# Patient Record
Sex: FEMALE | Race: WHITE | NOT HISPANIC OR LATINO
[De-identification: names, ages, dates, MRNs, and addresses within clinical notes are randomized per-mention and may not be internally consistent; named-entity substitution may affect disease eponyms.]

---

## 2017-06-30 PROBLEM — Z00.00 ENCOUNTER FOR PREVENTIVE HEALTH EXAMINATION: Status: ACTIVE | Noted: 2017-06-30

## 2017-07-17 ENCOUNTER — APPOINTMENT (OUTPATIENT)
Dept: ORTHOPEDIC SURGERY | Facility: CLINIC | Age: 77
End: 2017-07-17

## 2017-07-17 ENCOUNTER — OTHER (OUTPATIENT)
Age: 77
End: 2017-07-17

## 2017-07-17 DIAGNOSIS — M79.643 PAIN IN UNSPECIFIED HAND: ICD-10-CM

## 2017-08-03 ENCOUNTER — APPOINTMENT (OUTPATIENT)
Dept: ORTHOPEDIC SURGERY | Facility: CLINIC | Age: 77
End: 2017-08-03
Payer: MEDICARE

## 2017-08-03 VITALS
SYSTOLIC BLOOD PRESSURE: 141 MMHG | WEIGHT: 150 LBS | DIASTOLIC BLOOD PRESSURE: 74 MMHG | HEIGHT: 64 IN | BODY MASS INDEX: 25.61 KG/M2 | HEART RATE: 61 BPM

## 2017-08-03 DIAGNOSIS — Z87.891 PERSONAL HISTORY OF NICOTINE DEPENDENCE: ICD-10-CM

## 2017-08-03 DIAGNOSIS — Z86.39 PERSONAL HISTORY OF OTHER ENDOCRINE, NUTRITIONAL AND METABOLIC DISEASE: ICD-10-CM

## 2017-08-03 DIAGNOSIS — Z80.9 FAMILY HISTORY OF MALIGNANT NEOPLASM, UNSPECIFIED: ICD-10-CM

## 2017-08-03 DIAGNOSIS — Z78.9 OTHER SPECIFIED HEALTH STATUS: ICD-10-CM

## 2017-08-03 DIAGNOSIS — Z86.79 PERSONAL HISTORY OF OTHER DISEASES OF THE CIRCULATORY SYSTEM: ICD-10-CM

## 2017-08-03 DIAGNOSIS — M67.442 GANGLION, LEFT HAND: ICD-10-CM

## 2017-08-03 PROCEDURE — 99203 OFFICE O/P NEW LOW 30 MIN: CPT

## 2017-08-03 PROCEDURE — 73120 X-RAY EXAM OF HAND: CPT | Mod: LT

## 2017-08-03 RX ORDER — ATORVASTATIN CALCIUM 80 MG/1
TABLET, FILM COATED ORAL
Refills: 0 | Status: ACTIVE | COMMUNITY

## 2017-08-03 RX ORDER — ATENOLOL 50 MG/1
TABLET ORAL
Refills: 0 | Status: ACTIVE | COMMUNITY

## 2017-08-03 RX ORDER — LOSARTAN POTASSIUM 100 MG/1
TABLET, FILM COATED ORAL
Refills: 0 | Status: ACTIVE | COMMUNITY

## 2017-08-03 RX ORDER — FENOFIBRATE 145 MG/1
TABLET ORAL
Refills: 0 | Status: ACTIVE | COMMUNITY

## 2018-04-12 ENCOUNTER — EMERGENCY (EMERGENCY)
Facility: HOSPITAL | Age: 78
LOS: 1 days | End: 2018-04-12
Attending: STUDENT IN AN ORGANIZED HEALTH CARE EDUCATION/TRAINING PROGRAM
Payer: COMMERCIAL

## 2018-04-12 VITALS
TEMPERATURE: 98 F | DIASTOLIC BLOOD PRESSURE: 92 MMHG | HEART RATE: 102 BPM | RESPIRATION RATE: 23 BRPM | SYSTOLIC BLOOD PRESSURE: 182 MMHG | OXYGEN SATURATION: 96 % | HEIGHT: 64 IN | WEIGHT: 149.91 LBS

## 2018-04-12 PROCEDURE — 99283 EMERGENCY DEPT VISIT LOW MDM: CPT | Mod: 25

## 2018-04-12 PROCEDURE — 73130 X-RAY EXAM OF HAND: CPT | Mod: 26,RT

## 2018-04-12 PROCEDURE — 73130 X-RAY EXAM OF HAND: CPT

## 2018-04-12 PROCEDURE — 73564 X-RAY EXAM KNEE 4 OR MORE: CPT | Mod: 26,RT

## 2018-04-12 PROCEDURE — 99284 EMERGENCY DEPT VISIT MOD MDM: CPT | Mod: 25

## 2018-04-12 PROCEDURE — 29125 APPL SHORT ARM SPLINT STATIC: CPT | Mod: RT

## 2018-04-12 PROCEDURE — 73564 X-RAY EXAM KNEE 4 OR MORE: CPT

## 2018-04-12 RX ORDER — ACETAMINOPHEN 500 MG
975 TABLET ORAL ONCE
Qty: 0 | Refills: 0 | Status: COMPLETED | OUTPATIENT
Start: 2018-04-12 | End: 2018-04-12

## 2018-04-12 RX ADMIN — Medication 975 MILLIGRAM(S): at 02:59

## 2018-04-12 NOTE — ED ADULT TRIAGE NOTE - CHIEF COMPLAINT QUOTE
I was walking I turned fast foot got caught on uneven cement I fell rt hand pain swelling and rt knee pain. denies blood thinners denies hitting head occurred this afternoon

## 2018-04-12 NOTE — ED ADULT NURSE NOTE - OBJECTIVE STATEMENT
pt A&OX4, c/o right knee and hand pain, pt states that she has hx of vertigo and turned her head to fast got dizziness and tripped over uneven cement and landed on right knee and hand, pt has abrasion to right knee and and hand, pt denies loc, head trauma, blood thinners,

## 2018-04-12 NOTE — ED PROVIDER NOTE - OBJECTIVE STATEMENT
79 y/o F pt with hx of vertigo presents to ED c/o R hand pain, R hand swelling and R knee pain s/p trip and fall that occurred yesterday afternoon. Pt states she turned around quickly then tripped over uneven cement. Pt is not on blood thinners. Denies LOC, head injury, neck pain, back pain, nausea, and vomiting. No further complaints at this time.

## 2018-04-19 ENCOUNTER — APPOINTMENT (OUTPATIENT)
Dept: ORTHOPEDIC SURGERY | Facility: CLINIC | Age: 78
End: 2018-04-19
Payer: MEDICARE

## 2018-04-19 VITALS
WEIGHT: 150 LBS | DIASTOLIC BLOOD PRESSURE: 70 MMHG | HEIGHT: 64 IN | SYSTOLIC BLOOD PRESSURE: 120 MMHG | HEART RATE: 72 BPM | BODY MASS INDEX: 25.61 KG/M2

## 2018-04-19 DIAGNOSIS — M79.641 PAIN IN RIGHT HAND: ICD-10-CM

## 2018-04-19 DIAGNOSIS — S42.294A OTHER NONDISPLACED FRACTURE OF UPPER END OF RIGHT HUMERUS, INITIAL ENCOUNTER FOR CLOSED FRACTURE: ICD-10-CM

## 2018-04-19 PROCEDURE — 26600 TREAT METACARPAL FRACTURE: CPT | Mod: RT

## 2018-04-19 PROCEDURE — 73130 X-RAY EXAM OF HAND: CPT | Mod: RT

## 2018-04-19 PROCEDURE — 99215 OFFICE O/P EST HI 40 MIN: CPT | Mod: 57

## 2018-04-20 PROBLEM — S42.294A OTHER CLOSED NONDISPLACED FRACTURE OF PROXIMAL END OF RIGHT HUMERUS, INITIAL ENCOUNTER: Status: ACTIVE | Noted: 2018-04-19

## 2018-05-08 ENCOUNTER — APPOINTMENT (OUTPATIENT)
Dept: ORTHOPEDIC SURGERY | Facility: CLINIC | Age: 78
End: 2018-05-08
Payer: MEDICARE

## 2018-05-08 VITALS
HEIGHT: 64 IN | WEIGHT: 150 LBS | DIASTOLIC BLOOD PRESSURE: 60 MMHG | HEART RATE: 71 BPM | SYSTOLIC BLOOD PRESSURE: 111 MMHG | BODY MASS INDEX: 25.61 KG/M2

## 2018-05-08 DIAGNOSIS — S62.309A UNSPECIFIED FRACTURE OF UNSPECIFIED METACARPAL BONE, INITIAL ENCOUNTER FOR CLOSED FRACTURE: ICD-10-CM

## 2018-05-08 PROCEDURE — 99024 POSTOP FOLLOW-UP VISIT: CPT

## 2018-05-08 PROCEDURE — 73130 X-RAY EXAM OF HAND: CPT | Mod: RT

## 2018-05-21 ENCOUNTER — OUTPATIENT (OUTPATIENT)
Dept: OUTPATIENT SERVICES | Facility: HOSPITAL | Age: 78
LOS: 1 days | End: 2018-05-21
Payer: COMMERCIAL

## 2018-05-21 DIAGNOSIS — S62.309A UNSPECIFIED FRACTURE OF UNSPECIFIED METACARPAL BONE, INITIAL ENCOUNTER FOR CLOSED FRACTURE: ICD-10-CM

## 2018-05-21 DIAGNOSIS — Z51.89 ENCOUNTER FOR OTHER SPECIFIED AFTERCARE: ICD-10-CM

## 2018-06-07 ENCOUNTER — APPOINTMENT (OUTPATIENT)
Dept: ORTHOPEDIC SURGERY | Facility: CLINIC | Age: 78
End: 2018-06-07
Payer: MEDICARE

## 2018-06-07 VITALS
HEIGHT: 64 IN | HEART RATE: 58 BPM | SYSTOLIC BLOOD PRESSURE: 152 MMHG | DIASTOLIC BLOOD PRESSURE: 78 MMHG | BODY MASS INDEX: 25.61 KG/M2 | WEIGHT: 150 LBS

## 2018-06-07 DIAGNOSIS — G56.01 CARPAL TUNNEL SYNDROME, RIGHT UPPER LIMB: ICD-10-CM

## 2018-06-07 DIAGNOSIS — M65.331 TRIGGER FINGER, RIGHT MIDDLE FINGER: ICD-10-CM

## 2018-06-07 PROCEDURE — 99214 OFFICE O/P EST MOD 30 MIN: CPT | Mod: 24

## 2018-06-26 PROCEDURE — 97140 MANUAL THERAPY 1/> REGIONS: CPT

## 2018-06-26 PROCEDURE — 97166 OT EVAL MOD COMPLEX 45 MIN: CPT

## 2018-06-26 PROCEDURE — 97110 THERAPEUTIC EXERCISES: CPT

## 2018-08-07 ENCOUNTER — EMERGENCY (EMERGENCY)
Facility: HOSPITAL | Age: 78
LOS: 1 days | Discharge: HOME | End: 2018-08-07
Attending: EMERGENCY MEDICINE
Payer: COMMERCIAL

## 2018-08-07 VITALS
HEART RATE: 76 BPM | OXYGEN SATURATION: 95 % | DIASTOLIC BLOOD PRESSURE: 70 MMHG | SYSTOLIC BLOOD PRESSURE: 126 MMHG | TEMPERATURE: 98 F | RESPIRATION RATE: 18 BRPM

## 2018-08-07 VITALS — WEIGHT: 149.03 LBS | HEIGHT: 64 IN

## 2018-08-07 PROBLEM — I10 ESSENTIAL (PRIMARY) HYPERTENSION: Chronic | Status: ACTIVE | Noted: 2018-04-12

## 2018-08-07 PROBLEM — I21.9 ACUTE MYOCARDIAL INFARCTION, UNSPECIFIED: Chronic | Status: ACTIVE | Noted: 2018-04-12

## 2018-08-07 PROBLEM — R42 DIZZINESS AND GIDDINESS: Chronic | Status: ACTIVE | Noted: 2018-04-17

## 2018-08-07 PROBLEM — E78.00 PURE HYPERCHOLESTEROLEMIA, UNSPECIFIED: Chronic | Status: ACTIVE | Noted: 2018-04-12

## 2018-08-07 LAB
ALBUMIN SERPL ELPH-MCNC: 4.2 G/DL — SIGNIFICANT CHANGE UP (ref 3.3–5.2)
ALP SERPL-CCNC: 64 U/L — SIGNIFICANT CHANGE UP (ref 40–120)
ALT FLD-CCNC: 24 U/L — SIGNIFICANT CHANGE UP
ANION GAP SERPL CALC-SCNC: 18 MMOL/L — HIGH (ref 5–17)
APPEARANCE UR: CLEAR — SIGNIFICANT CHANGE UP
AST SERPL-CCNC: 46 U/L — HIGH
BACTERIA # UR AUTO: ABNORMAL
BASOPHILS # BLD AUTO: 0 K/UL — SIGNIFICANT CHANGE UP (ref 0–0.2)
BASOPHILS NFR BLD AUTO: 0.3 % — SIGNIFICANT CHANGE UP (ref 0–2)
BILIRUB SERPL-MCNC: 1.3 MG/DL — SIGNIFICANT CHANGE UP (ref 0.4–2)
BILIRUB UR-MCNC: NEGATIVE — SIGNIFICANT CHANGE UP
BUN SERPL-MCNC: 26 MG/DL — HIGH (ref 8–20)
CALCIUM SERPL-MCNC: 9.8 MG/DL — SIGNIFICANT CHANGE UP (ref 8.6–10.2)
CHLORIDE SERPL-SCNC: 96 MMOL/L — LOW (ref 98–107)
CO2 SERPL-SCNC: 23 MMOL/L — SIGNIFICANT CHANGE UP (ref 22–29)
COLOR SPEC: YELLOW — SIGNIFICANT CHANGE UP
CREAT SERPL-MCNC: 1.16 MG/DL — SIGNIFICANT CHANGE UP (ref 0.5–1.3)
DIFF PNL FLD: ABNORMAL
EOSINOPHIL # BLD AUTO: 0 K/UL — SIGNIFICANT CHANGE UP (ref 0–0.5)
EOSINOPHIL NFR BLD AUTO: 0.1 % — SIGNIFICANT CHANGE UP (ref 0–6)
EPI CELLS # UR: SIGNIFICANT CHANGE UP
GLUCOSE SERPL-MCNC: 125 MG/DL — HIGH (ref 70–115)
GLUCOSE UR QL: NEGATIVE MG/DL — SIGNIFICANT CHANGE UP
HCT VFR BLD CALC: 51.3 % — HIGH (ref 37–47)
HGB BLD-MCNC: 16.6 G/DL — HIGH (ref 12–16)
KETONES UR-MCNC: NEGATIVE — SIGNIFICANT CHANGE UP
LACTATE BLDV-MCNC: 1.3 MMOL/L — SIGNIFICANT CHANGE UP (ref 0.5–2)
LEUKOCYTE ESTERASE UR-ACNC: ABNORMAL
LYMPHOCYTES # BLD AUTO: 0.9 K/UL — LOW (ref 1–4.8)
LYMPHOCYTES # BLD AUTO: 7.4 % — LOW (ref 20–55)
MAGNESIUM SERPL-MCNC: 1.9 MG/DL — SIGNIFICANT CHANGE UP (ref 1.6–2.6)
MCHC RBC-ENTMCNC: 28.6 PG — SIGNIFICANT CHANGE UP (ref 27–31)
MCHC RBC-ENTMCNC: 32.4 G/DL — SIGNIFICANT CHANGE UP (ref 32–36)
MCV RBC AUTO: 88.4 FL — SIGNIFICANT CHANGE UP (ref 81–99)
MONOCYTES # BLD AUTO: 0.8 K/UL — SIGNIFICANT CHANGE UP (ref 0–0.8)
MONOCYTES NFR BLD AUTO: 6.6 % — SIGNIFICANT CHANGE UP (ref 3–10)
NEUTROPHILS # BLD AUTO: 10 K/UL — HIGH (ref 1.8–8)
NEUTROPHILS NFR BLD AUTO: 85 % — HIGH (ref 37–73)
NITRITE UR-MCNC: POSITIVE
PH UR: 7 — SIGNIFICANT CHANGE UP (ref 5–8)
PHOSPHATE SERPL-MCNC: 2.6 MG/DL — SIGNIFICANT CHANGE UP (ref 2.4–4.7)
PLATELET # BLD AUTO: 368 K/UL — SIGNIFICANT CHANGE UP (ref 150–400)
POTASSIUM SERPL-MCNC: 5.1 MMOL/L — SIGNIFICANT CHANGE UP (ref 3.5–5.3)
POTASSIUM SERPL-SCNC: 5.1 MMOL/L — SIGNIFICANT CHANGE UP (ref 3.5–5.3)
PROT SERPL-MCNC: 8 G/DL — SIGNIFICANT CHANGE UP (ref 6.6–8.7)
PROT UR-MCNC: 30 MG/DL
RBC # BLD: 5.8 M/UL — HIGH (ref 4.4–5.2)
RBC # FLD: 14.6 % — SIGNIFICANT CHANGE UP (ref 11–15.6)
RBC CASTS # UR COMP ASSIST: SIGNIFICANT CHANGE UP /HPF (ref 0–4)
SODIUM SERPL-SCNC: 137 MMOL/L — SIGNIFICANT CHANGE UP (ref 135–145)
SP GR SPEC: 1.01 — SIGNIFICANT CHANGE UP (ref 1.01–1.02)
TROPONIN T SERPL-MCNC: <0.01 NG/ML — SIGNIFICANT CHANGE UP (ref 0–0.06)
UROBILINOGEN FLD QL: NEGATIVE MG/DL — SIGNIFICANT CHANGE UP
WBC # BLD: 11.7 K/UL — HIGH (ref 4.8–10.8)
WBC # FLD AUTO: 11.7 K/UL — HIGH (ref 4.8–10.8)
WBC UR QL: ABNORMAL

## 2018-08-07 PROCEDURE — 84100 ASSAY OF PHOSPHORUS: CPT

## 2018-08-07 PROCEDURE — 70450 CT HEAD/BRAIN W/O DYE: CPT | Mod: 26

## 2018-08-07 PROCEDURE — 83735 ASSAY OF MAGNESIUM: CPT

## 2018-08-07 PROCEDURE — 83605 ASSAY OF LACTIC ACID: CPT

## 2018-08-07 PROCEDURE — 87150 DNA/RNA AMPLIFIED PROBE: CPT

## 2018-08-07 PROCEDURE — 93005 ELECTROCARDIOGRAM TRACING: CPT

## 2018-08-07 PROCEDURE — 81001 URINALYSIS AUTO W/SCOPE: CPT

## 2018-08-07 PROCEDURE — 87040 BLOOD CULTURE FOR BACTERIA: CPT

## 2018-08-07 PROCEDURE — 70450 CT HEAD/BRAIN W/O DYE: CPT

## 2018-08-07 PROCEDURE — 96374 THER/PROPH/DIAG INJ IV PUSH: CPT

## 2018-08-07 PROCEDURE — 99284 EMERGENCY DEPT VISIT MOD MDM: CPT

## 2018-08-07 PROCEDURE — 99284 EMERGENCY DEPT VISIT MOD MDM: CPT | Mod: 25

## 2018-08-07 PROCEDURE — 84484 ASSAY OF TROPONIN QUANT: CPT

## 2018-08-07 PROCEDURE — 80053 COMPREHEN METABOLIC PANEL: CPT

## 2018-08-07 PROCEDURE — 36415 COLL VENOUS BLD VENIPUNCTURE: CPT

## 2018-08-07 PROCEDURE — 96375 TX/PRO/DX INJ NEW DRUG ADDON: CPT

## 2018-08-07 PROCEDURE — 85027 COMPLETE CBC AUTOMATED: CPT

## 2018-08-07 PROCEDURE — 71046 X-RAY EXAM CHEST 2 VIEWS: CPT

## 2018-08-07 PROCEDURE — 71046 X-RAY EXAM CHEST 2 VIEWS: CPT | Mod: 26

## 2018-08-07 PROCEDURE — 82550 ASSAY OF CK (CPK): CPT

## 2018-08-07 PROCEDURE — 87186 SC STD MICRODIL/AGAR DIL: CPT

## 2018-08-07 PROCEDURE — 93010 ELECTROCARDIOGRAM REPORT: CPT

## 2018-08-07 RX ORDER — METOCLOPRAMIDE HCL 10 MG
10 TABLET ORAL ONCE
Qty: 0 | Refills: 0 | Status: DISCONTINUED | OUTPATIENT
Start: 2018-08-07 | End: 2018-08-07

## 2018-08-07 RX ORDER — SODIUM CHLORIDE 9 MG/ML
2000 INJECTION INTRAMUSCULAR; INTRAVENOUS; SUBCUTANEOUS ONCE
Qty: 0 | Refills: 0 | Status: COMPLETED | OUTPATIENT
Start: 2018-08-07 | End: 2018-08-07

## 2018-08-07 RX ORDER — CEPHALEXIN 500 MG
1 CAPSULE ORAL
Qty: 14 | Refills: 0 | OUTPATIENT
Start: 2018-08-07 | End: 2018-08-13

## 2018-08-07 RX ORDER — METOCLOPRAMIDE HCL 10 MG
10 TABLET ORAL ONCE
Qty: 0 | Refills: 0 | Status: COMPLETED | OUTPATIENT
Start: 2018-08-07 | End: 2018-08-07

## 2018-08-07 RX ORDER — SODIUM CHLORIDE 9 MG/ML
1000 INJECTION INTRAMUSCULAR; INTRAVENOUS; SUBCUTANEOUS ONCE
Qty: 0 | Refills: 0 | Status: COMPLETED | OUTPATIENT
Start: 2018-08-07 | End: 2018-08-07

## 2018-08-07 RX ORDER — CEFTRIAXONE 500 MG/1
1 INJECTION, POWDER, FOR SOLUTION INTRAMUSCULAR; INTRAVENOUS ONCE
Qty: 0 | Refills: 0 | Status: COMPLETED | OUTPATIENT
Start: 2018-08-07 | End: 2018-08-07

## 2018-08-07 RX ORDER — KETOROLAC TROMETHAMINE 30 MG/ML
30 SYRINGE (ML) INJECTION ONCE
Qty: 0 | Refills: 0 | Status: DISCONTINUED | OUTPATIENT
Start: 2018-08-07 | End: 2018-08-07

## 2018-08-07 RX ADMIN — Medication 10 MILLIGRAM(S): at 15:46

## 2018-08-07 RX ADMIN — Medication 30 MILLIGRAM(S): at 18:36

## 2018-08-07 RX ADMIN — SODIUM CHLORIDE 2000 MILLILITER(S): 9 INJECTION INTRAMUSCULAR; INTRAVENOUS; SUBCUTANEOUS at 20:14

## 2018-08-07 RX ADMIN — SODIUM CHLORIDE 1000 MILLILITER(S): 9 INJECTION INTRAMUSCULAR; INTRAVENOUS; SUBCUTANEOUS at 18:36

## 2018-08-07 RX ADMIN — CEFTRIAXONE 100 GRAM(S): 500 INJECTION, POWDER, FOR SOLUTION INTRAMUSCULAR; INTRAVENOUS at 18:36

## 2018-08-07 NOTE — ED PROVIDER NOTE - OBJECTIVE STATEMENT
This patient is a 78 year old woman who presents to the ER c/o generalized weakness, fatigue, and headache since yesterday.  She denies fever, visual changes, vomiting, chest pain, cough, SOB, and abdominal pain.  She does report diarrhea and nausea.  She has tried taking tylenol for her headache with mild relief.  Patient states that she is cold and feels chills all the way to her bones.

## 2018-08-07 NOTE — ED PROVIDER NOTE - MEDICAL DECISION MAKING DETAILS
Patient with chills, generalized weakness, headache and nausea.  CT negative for ICH.  No focal weakness on exam, no abdominal tenderness.  Patient concerned about chills.  Rectal temp elevated UA +.  Patient treated with abx and antipyretics with improvement.  She was given prescription for abx and instructed to follow-up with PMD.

## 2018-08-07 NOTE — ED ADULT NURSE REASSESSMENT NOTE - NS ED NURSE REASSESS COMMENT FT1
pt in agreement to rectal temp, 104.9 rectally, other vitals obtained, md walters made aware, to carry out orders posted, pt remains a/o x 3

## 2018-08-07 NOTE — ED ADULT TRIAGE NOTE - CHIEF COMPLAINT QUOTE
Pt presents to ED for eval of generalized malaise and weakness after being in the sun yesterday. Pt c/o chills and decreased appetite. C/o episodes of diarrhea and nausea. C/o generalized body "heaviness"

## 2018-08-07 NOTE — ED ADULT NURSE REASSESSMENT NOTE - NS ED NURSE REASSESS COMMENT FT1
patient rec'd at 1930 patient alert and coopertive iv to right ac intact and infusing well. patient states feeling better and aware of plan to recheck temp and increase fluids

## 2018-08-08 ENCOUNTER — INPATIENT (INPATIENT)
Facility: HOSPITAL | Age: 78
LOS: 6 days | Discharge: HOME | DRG: 871 | End: 2018-08-15
Attending: HOSPITALIST
Payer: COMMERCIAL

## 2018-08-08 VITALS
WEIGHT: 149.03 LBS | OXYGEN SATURATION: 90 % | DIASTOLIC BLOOD PRESSURE: 78 MMHG | SYSTOLIC BLOOD PRESSURE: 184 MMHG | TEMPERATURE: 100 F | HEART RATE: 97 BPM | RESPIRATION RATE: 26 BRPM | HEIGHT: 64 IN

## 2018-08-08 DIAGNOSIS — E78.00 PURE HYPERCHOLESTEROLEMIA, UNSPECIFIED: ICD-10-CM

## 2018-08-08 DIAGNOSIS — I10 ESSENTIAL (PRIMARY) HYPERTENSION: ICD-10-CM

## 2018-08-08 DIAGNOSIS — Z84.89 FAMILY HISTORY OF OTHER SPECIFIED CONDITIONS: Chronic | ICD-10-CM

## 2018-08-08 DIAGNOSIS — J18.9 PNEUMONIA, UNSPECIFIED ORGANISM: ICD-10-CM

## 2018-08-08 DIAGNOSIS — R78.81 BACTEREMIA: ICD-10-CM

## 2018-08-08 DIAGNOSIS — J40 BRONCHITIS, NOT SPECIFIED AS ACUTE OR CHRONIC: ICD-10-CM

## 2018-08-08 LAB
ALBUMIN SERPL ELPH-MCNC: 3.8 G/DL — SIGNIFICANT CHANGE UP (ref 3.3–5.2)
ALP SERPL-CCNC: 65 U/L — SIGNIFICANT CHANGE UP (ref 40–120)
ALT FLD-CCNC: 43 U/L — HIGH
ANION GAP SERPL CALC-SCNC: 17 MMOL/L — SIGNIFICANT CHANGE UP (ref 5–17)
ANISOCYTOSIS BLD QL: SLIGHT — SIGNIFICANT CHANGE UP
APPEARANCE UR: CLEAR — SIGNIFICANT CHANGE UP
APTT BLD: 30.3 SEC — SIGNIFICANT CHANGE UP (ref 27.5–37.4)
AST SERPL-CCNC: 60 U/L — HIGH
BACTERIA # UR AUTO: ABNORMAL
BASOPHILS # BLD AUTO: 0 K/UL — SIGNIFICANT CHANGE UP (ref 0–0.2)
BASOPHILS NFR BLD AUTO: 0 % — SIGNIFICANT CHANGE UP (ref 0–2)
BILIRUB SERPL-MCNC: 0.8 MG/DL — SIGNIFICANT CHANGE UP (ref 0.4–2)
BILIRUB UR-MCNC: NEGATIVE — SIGNIFICANT CHANGE UP
BUN SERPL-MCNC: 19 MG/DL — SIGNIFICANT CHANGE UP (ref 8–20)
CALCIUM SERPL-MCNC: 8.1 MG/DL — LOW (ref 8.6–10.2)
CHLORIDE SERPL-SCNC: 102 MMOL/L — SIGNIFICANT CHANGE UP (ref 98–107)
CO2 SERPL-SCNC: 19 MMOL/L — LOW (ref 22–29)
COLOR SPEC: YELLOW — SIGNIFICANT CHANGE UP
CREAT SERPL-MCNC: 1.01 MG/DL — SIGNIFICANT CHANGE UP (ref 0.5–1.3)
DIFF PNL FLD: ABNORMAL
E COLI DNA BLD POS QL NAA+NON-PROBE: SIGNIFICANT CHANGE UP
EOSINOPHIL # BLD AUTO: 0 K/UL — SIGNIFICANT CHANGE UP (ref 0–0.5)
EOSINOPHIL NFR BLD AUTO: 1 % — SIGNIFICANT CHANGE UP (ref 0–5)
EPI CELLS # UR: SIGNIFICANT CHANGE UP
GLUCOSE SERPL-MCNC: 189 MG/DL — HIGH (ref 70–115)
GLUCOSE UR QL: 250 MG/DL
HCT VFR BLD CALC: 45.8 % — SIGNIFICANT CHANGE UP (ref 37–47)
HGB BLD-MCNC: 15 G/DL — SIGNIFICANT CHANGE UP (ref 12–16)
INR BLD: 1.25 RATIO — HIGH (ref 0.88–1.16)
KETONES UR-MCNC: ABNORMAL
LACTATE BLDV-MCNC: 1.2 MMOL/L — SIGNIFICANT CHANGE UP (ref 0.5–2)
LACTATE BLDV-MCNC: 1.3 MMOL/L — SIGNIFICANT CHANGE UP (ref 0.5–2)
LACTATE SERPL-SCNC: 1.7 MMOL/L — SIGNIFICANT CHANGE UP (ref 0.5–2)
LEUKOCYTE ESTERASE UR-ACNC: ABNORMAL
LYMPHOCYTES # BLD AUTO: 0.7 K/UL — LOW (ref 1–4.8)
LYMPHOCYTES # BLD AUTO: 3 % — LOW (ref 20–55)
MCHC RBC-ENTMCNC: 28.8 PG — SIGNIFICANT CHANGE UP (ref 27–31)
MCHC RBC-ENTMCNC: 32.8 G/DL — SIGNIFICANT CHANGE UP (ref 32–36)
MCV RBC AUTO: 88.1 FL — SIGNIFICANT CHANGE UP (ref 81–99)
METHOD TYPE: SIGNIFICANT CHANGE UP
MONOCYTES # BLD AUTO: 0.7 K/UL — SIGNIFICANT CHANGE UP (ref 0–0.8)
MONOCYTES NFR BLD AUTO: 10 % — SIGNIFICANT CHANGE UP (ref 3–10)
NEUTROPHILS # BLD AUTO: 10.9 K/UL — HIGH (ref 1.8–8)
NEUTROPHILS NFR BLD AUTO: 86 % — HIGH (ref 37–73)
NITRITE UR-MCNC: NEGATIVE — SIGNIFICANT CHANGE UP
PH UR: 6.5 — SIGNIFICANT CHANGE UP (ref 5–8)
PLAT MORPH BLD: NORMAL — SIGNIFICANT CHANGE UP
PLATELET # BLD AUTO: 412 K/UL — HIGH (ref 150–400)
POIKILOCYTOSIS BLD QL AUTO: SLIGHT — SIGNIFICANT CHANGE UP
POTASSIUM SERPL-MCNC: 3.6 MMOL/L — SIGNIFICANT CHANGE UP (ref 3.5–5.3)
POTASSIUM SERPL-SCNC: 3.6 MMOL/L — SIGNIFICANT CHANGE UP (ref 3.5–5.3)
PROCALCITONIN SERPL-MCNC: 1.9 NG/ML — HIGH (ref 0–0.04)
PROT SERPL-MCNC: 6.8 G/DL — SIGNIFICANT CHANGE UP (ref 6.6–8.7)
PROT UR-MCNC: 30 MG/DL
PROTHROM AB SERPL-ACNC: 13.8 SEC — HIGH (ref 9.8–12.7)
RBC # BLD: 5.2 M/UL — SIGNIFICANT CHANGE UP (ref 4.4–5.2)
RBC # FLD: 14.6 % — SIGNIFICANT CHANGE UP (ref 11–15.6)
RBC BLD AUTO: SIGNIFICANT CHANGE UP
RBC CASTS # UR COMP ASSIST: SIGNIFICANT CHANGE UP /HPF (ref 0–4)
SODIUM SERPL-SCNC: 138 MMOL/L — SIGNIFICANT CHANGE UP (ref 135–145)
SP GR SPEC: 1.01 — SIGNIFICANT CHANGE UP (ref 1.01–1.02)
UROBILINOGEN FLD QL: NEGATIVE MG/DL — SIGNIFICANT CHANGE UP
WBC # BLD: 12.6 K/UL — HIGH (ref 4.8–10.8)
WBC # FLD AUTO: 12.6 K/UL — HIGH (ref 4.8–10.8)
WBC UR QL: ABNORMAL

## 2018-08-08 PROCEDURE — 71045 X-RAY EXAM CHEST 1 VIEW: CPT | Mod: 26

## 2018-08-08 PROCEDURE — 99285 EMERGENCY DEPT VISIT HI MDM: CPT | Mod: 25

## 2018-08-08 PROCEDURE — 74176 CT ABD & PELVIS W/O CONTRAST: CPT | Mod: 26

## 2018-08-08 PROCEDURE — 99223 1ST HOSP IP/OBS HIGH 75: CPT

## 2018-08-08 PROCEDURE — 93010 ELECTROCARDIOGRAM REPORT: CPT

## 2018-08-08 PROCEDURE — 71275 CT ANGIOGRAPHY CHEST: CPT | Mod: 26

## 2018-08-08 RX ORDER — SODIUM CHLORIDE 9 MG/ML
2500 INJECTION, SOLUTION INTRAVENOUS ONCE
Qty: 0 | Refills: 0 | Status: COMPLETED | OUTPATIENT
Start: 2018-08-08 | End: 2018-08-08

## 2018-08-08 RX ORDER — PIPERACILLIN AND TAZOBACTAM 4; .5 G/20ML; G/20ML
3.38 INJECTION, POWDER, LYOPHILIZED, FOR SOLUTION INTRAVENOUS EVERY 8 HOURS
Qty: 0 | Refills: 0 | Status: DISCONTINUED | OUTPATIENT
Start: 2018-08-08 | End: 2018-08-09

## 2018-08-08 RX ORDER — ATENOLOL 25 MG/1
1 TABLET ORAL
Qty: 0 | Refills: 0 | COMMUNITY

## 2018-08-08 RX ORDER — ACETAMINOPHEN 500 MG
650 TABLET ORAL EVERY 6 HOURS
Qty: 0 | Refills: 0 | Status: DISCONTINUED | OUTPATIENT
Start: 2018-08-08 | End: 2018-08-15

## 2018-08-08 RX ORDER — ATENOLOL 25 MG/1
100 TABLET ORAL DAILY
Qty: 0 | Refills: 0 | Status: DISCONTINUED | OUTPATIENT
Start: 2018-08-08 | End: 2018-08-15

## 2018-08-08 RX ORDER — LOSARTAN/HYDROCHLOROTHIAZIDE 100MG-25MG
1 TABLET ORAL
Qty: 0 | Refills: 0 | COMMUNITY

## 2018-08-08 RX ORDER — ONDANSETRON 8 MG/1
4 TABLET, FILM COATED ORAL EVERY 6 HOURS
Qty: 0 | Refills: 0 | Status: DISCONTINUED | OUTPATIENT
Start: 2018-08-08 | End: 2018-08-11

## 2018-08-08 RX ORDER — VANCOMYCIN HCL 1 G
1000 VIAL (EA) INTRAVENOUS ONCE
Qty: 0 | Refills: 0 | Status: COMPLETED | OUTPATIENT
Start: 2018-08-08 | End: 2018-08-08

## 2018-08-08 RX ORDER — ACETAMINOPHEN 500 MG
650 TABLET ORAL ONCE
Qty: 0 | Refills: 0 | Status: COMPLETED | OUTPATIENT
Start: 2018-08-08 | End: 2018-08-08

## 2018-08-08 RX ORDER — FENOFIBRATE,MICRONIZED 130 MG
48 CAPSULE ORAL DAILY
Qty: 0 | Refills: 0 | Status: DISCONTINUED | OUTPATIENT
Start: 2018-08-08 | End: 2018-08-15

## 2018-08-08 RX ORDER — SODIUM CHLORIDE 9 MG/ML
1000 INJECTION INTRAMUSCULAR; INTRAVENOUS; SUBCUTANEOUS
Qty: 0 | Refills: 0 | Status: DISCONTINUED | OUTPATIENT
Start: 2018-08-08 | End: 2018-08-09

## 2018-08-08 RX ORDER — PIPERACILLIN AND TAZOBACTAM 4; .5 G/20ML; G/20ML
3.38 INJECTION, POWDER, LYOPHILIZED, FOR SOLUTION INTRAVENOUS ONCE
Qty: 0 | Refills: 0 | Status: COMPLETED | OUTPATIENT
Start: 2018-08-08 | End: 2018-08-08

## 2018-08-08 RX ORDER — PANTOPRAZOLE SODIUM 20 MG/1
40 TABLET, DELAYED RELEASE ORAL DAILY
Qty: 0 | Refills: 0 | Status: DISCONTINUED | OUTPATIENT
Start: 2018-08-08 | End: 2018-08-10

## 2018-08-08 RX ORDER — SACCHAROMYCES BOULARDII 250 MG
250 POWDER IN PACKET (EA) ORAL
Qty: 0 | Refills: 0 | Status: DISCONTINUED | OUTPATIENT
Start: 2018-08-08 | End: 2018-08-15

## 2018-08-08 RX ORDER — ENOXAPARIN SODIUM 100 MG/ML
40 INJECTION SUBCUTANEOUS EVERY 24 HOURS
Qty: 0 | Refills: 0 | Status: DISCONTINUED | OUTPATIENT
Start: 2018-08-08 | End: 2018-08-15

## 2018-08-08 RX ORDER — ATORVASTATIN CALCIUM 80 MG/1
40 TABLET, FILM COATED ORAL AT BEDTIME
Qty: 0 | Refills: 0 | Status: DISCONTINUED | OUTPATIENT
Start: 2018-08-08 | End: 2018-08-15

## 2018-08-08 RX ORDER — FENOFIBRATE,MICRONIZED 130 MG
1 CAPSULE ORAL
Qty: 0 | Refills: 0 | COMMUNITY

## 2018-08-08 RX ORDER — ATORVASTATIN CALCIUM 80 MG/1
1 TABLET, FILM COATED ORAL
Qty: 0 | Refills: 0 | COMMUNITY

## 2018-08-08 RX ADMIN — SODIUM CHLORIDE 2500 MILLILITER(S): 9 INJECTION, SOLUTION INTRAVENOUS at 06:30

## 2018-08-08 RX ADMIN — Medication 650 MILLIGRAM(S): at 06:50

## 2018-08-08 RX ADMIN — ATENOLOL 100 MILLIGRAM(S): 25 TABLET ORAL at 19:39

## 2018-08-08 RX ADMIN — ONDANSETRON 4 MILLIGRAM(S): 8 TABLET, FILM COATED ORAL at 22:08

## 2018-08-08 RX ADMIN — PIPERACILLIN AND TAZOBACTAM 25 GRAM(S): 4; .5 INJECTION, POWDER, LYOPHILIZED, FOR SOLUTION INTRAVENOUS at 16:48

## 2018-08-08 RX ADMIN — Medication 250 MILLIGRAM(S): at 19:22

## 2018-08-08 RX ADMIN — SODIUM CHLORIDE 100 MILLILITER(S): 9 INJECTION INTRAMUSCULAR; INTRAVENOUS; SUBCUTANEOUS at 16:49

## 2018-08-08 RX ADMIN — ATORVASTATIN CALCIUM 40 MILLIGRAM(S): 80 TABLET, FILM COATED ORAL at 22:09

## 2018-08-08 RX ADMIN — Medication 650 MILLIGRAM(S): at 16:48

## 2018-08-08 RX ADMIN — SODIUM CHLORIDE 100 MILLILITER(S): 9 INJECTION INTRAMUSCULAR; INTRAVENOUS; SUBCUTANEOUS at 19:42

## 2018-08-08 RX ADMIN — ONDANSETRON 4 MILLIGRAM(S): 8 TABLET, FILM COATED ORAL at 16:50

## 2018-08-08 RX ADMIN — ENOXAPARIN SODIUM 40 MILLIGRAM(S): 100 INJECTION SUBCUTANEOUS at 22:11

## 2018-08-08 RX ADMIN — SODIUM CHLORIDE 2500 MILLILITER(S): 9 INJECTION, SOLUTION INTRAVENOUS at 10:02

## 2018-08-08 RX ADMIN — PIPERACILLIN AND TAZOBACTAM 3.38 GRAM(S): 4; .5 INJECTION, POWDER, LYOPHILIZED, FOR SOLUTION INTRAVENOUS at 07:19

## 2018-08-08 RX ADMIN — PIPERACILLIN AND TAZOBACTAM 200 GRAM(S): 4; .5 INJECTION, POWDER, LYOPHILIZED, FOR SOLUTION INTRAVENOUS at 06:50

## 2018-08-08 RX ADMIN — Medication 1000 MILLIGRAM(S): at 11:50

## 2018-08-08 RX ADMIN — Medication 250 MILLIGRAM(S): at 10:01

## 2018-08-08 RX ADMIN — PIPERACILLIN AND TAZOBACTAM 25 GRAM(S): 4; .5 INJECTION, POWDER, LYOPHILIZED, FOR SOLUTION INTRAVENOUS at 22:09

## 2018-08-08 RX ADMIN — SODIUM CHLORIDE 100 MILLILITER(S): 9 INJECTION INTRAMUSCULAR; INTRAVENOUS; SUBCUTANEOUS at 19:21

## 2018-08-08 NOTE — H&P ADULT - HISTORY OF PRESENT ILLNESS
78 yr old female with hypertension, hyperlipidemia, myocardial infarction admitted with complaints of fever. She was in the ED with fever 104, headaches yesterday, was discharged home after being prescribed antibiotics for UTI. She returned with complaints of chills and feeling weak. Reports feeling well until 2 day 78 yr old female with hypertension, hyperlipidemia, myocardial infarction admitted with complaints of fever. She was in the ED with fever 104, headaches yesterday, was discharged home after being prescribed antibiotics for UTI. She returned with complaints of chills and feeling weak. Reports feeling well until 2 days ago, when she started having fevers and malaise. Denies sick contacts. She endorses loose stools yesterday, no abdominal pain. Poor appetite.     PMD Eliz

## 2018-08-08 NOTE — ED ADULT NURSE NOTE - OBJECTIVE STATEMENT
Pt arrives to [facility] from home for SOB and chills. Code sepsis initiated in ambulance triage due to temp, tachycardia, and tachypnea. Pt c/o "chest heaviness" at time of assessment as well as nausea and "I feel sick to my stomach". According to pt she was seen here at [facility] yesterday and dc'd home with a diagnosis of a UTI. Pt hasn't picked up her abx yet. Pt denies abd pain, urinary sx's, weakness, chest pain, vomiting.

## 2018-08-08 NOTE — ED ADULT NURSE NOTE - NSIMPLEMENTINTERV_GEN_ALL_ED
Implemented All Universal Safety Interventions:  [place] to call system. Call bell, personal items and telephone within reach. Instruct patient to call for assistance. Room bathroom lighting operational. Non-slip footwear when patient is off stretcher. Physically safe environment: no spills, clutter or unnecessary equipment. Stretcher in lowest position, wheels locked, appropriate side rails in place.

## 2018-08-08 NOTE — H&P ADULT - ASSESSMENT
78 yr old female with hypertension, hyperlipidemia, myocardial infarction admitted with complaints of fever. She was recently discharged from ED for UTI, returned with worsening symptoms, fever and chills. Noted to have gram negative bacteremia, positive UA. She had a CT chest suggestive of bronchitis. Labs and imaging reviewed.

## 2018-08-08 NOTE — ED ADULT TRIAGE NOTE - CHIEF COMPLAINT QUOTE
pt with complaints of shortness of breath, pt with reports of fever at home 104. pt states she has chest heaviness. pt diagnosed with UTI yesterday. code sepsis called.

## 2018-08-08 NOTE — ED ADULT NURSE REASSESSMENT NOTE - NS ED NURSE REASSESS COMMENT FT1
patient aox4 comfortable in appearance. respirations clear equal and unlabored. heart sounds s1 s2  WDL, abdomen soft nontender + bowel sounds all 4 quadrants, moves all extremities. + pulse all 4 extremities. + sensation all 4 extremities. patient and family updated on plan of care. patient and family educated on hourly rounding procedures and understands call bell system. provided blanket and  socks.

## 2018-08-08 NOTE — H&P ADULT - PROBLEM SELECTOR PLAN 1
Sepsis and bacteremia likely from UTI, will start Zosyn, CT abdomen pelvis, unable to tolerate contrast. IV hydration, ID evaluation, repeat blood cultures.

## 2018-08-09 DIAGNOSIS — K57.92 DIVERTICULITIS OF INTESTINE, PART UNSPECIFIED, WITHOUT PERFORATION OR ABSCESS WITHOUT BLEEDING: ICD-10-CM

## 2018-08-09 DIAGNOSIS — R78.81 BACTEREMIA: ICD-10-CM

## 2018-08-09 DIAGNOSIS — R50.9 FEVER, UNSPECIFIED: ICD-10-CM

## 2018-08-09 LAB
-  AMIKACIN: SIGNIFICANT CHANGE UP
-  AMIKACIN: SIGNIFICANT CHANGE UP
-  AMPICILLIN/SULBACTAM: SIGNIFICANT CHANGE UP
-  AMPICILLIN/SULBACTAM: SIGNIFICANT CHANGE UP
-  AMPICILLIN: SIGNIFICANT CHANGE UP
-  AMPICILLIN: SIGNIFICANT CHANGE UP
-  AZTREONAM: SIGNIFICANT CHANGE UP
-  AZTREONAM: SIGNIFICANT CHANGE UP
-  CEFAZOLIN: SIGNIFICANT CHANGE UP
-  CEFAZOLIN: SIGNIFICANT CHANGE UP
-  CEFEPIME: SIGNIFICANT CHANGE UP
-  CEFEPIME: SIGNIFICANT CHANGE UP
-  CEFOTAXIME: SIGNIFICANT CHANGE UP
-  CEFOTAXIME: SIGNIFICANT CHANGE UP
-  CEFOXITIN: SIGNIFICANT CHANGE UP
-  CEFOXITIN: SIGNIFICANT CHANGE UP
-  CEFTAZIDIME: SIGNIFICANT CHANGE UP
-  CEFTAZIDIME: SIGNIFICANT CHANGE UP
-  CEFTRIAXONE: SIGNIFICANT CHANGE UP
-  CEFTRIAXONE: SIGNIFICANT CHANGE UP
-  CEFUROXIME: SIGNIFICANT CHANGE UP
-  CEFUROXIME: SIGNIFICANT CHANGE UP
-  CIPROFLOXACIN: SIGNIFICANT CHANGE UP
-  CIPROFLOXACIN: SIGNIFICANT CHANGE UP
-  ERTAPENEM: SIGNIFICANT CHANGE UP
-  ERTAPENEM: SIGNIFICANT CHANGE UP
-  GENTAMICIN: SIGNIFICANT CHANGE UP
-  GENTAMICIN: SIGNIFICANT CHANGE UP
-  IMIPENEM: SIGNIFICANT CHANGE UP
-  IMIPENEM: SIGNIFICANT CHANGE UP
-  LEVOFLOXACIN: SIGNIFICANT CHANGE UP
-  LEVOFLOXACIN: SIGNIFICANT CHANGE UP
-  MEROPENEM: SIGNIFICANT CHANGE UP
-  MEROPENEM: SIGNIFICANT CHANGE UP
-  PIPERACILLIN/TAZOBACTAM: SIGNIFICANT CHANGE UP
-  PIPERACILLIN/TAZOBACTAM: SIGNIFICANT CHANGE UP
-  TIGECYCLINE: SIGNIFICANT CHANGE UP
-  TIGECYCLINE: SIGNIFICANT CHANGE UP
-  TOBRAMYCIN: SIGNIFICANT CHANGE UP
-  TOBRAMYCIN: SIGNIFICANT CHANGE UP
-  TRIMETHOPRIM/SULFAMETHOXAZOLE: SIGNIFICANT CHANGE UP
-  TRIMETHOPRIM/SULFAMETHOXAZOLE: SIGNIFICANT CHANGE UP
ANION GAP SERPL CALC-SCNC: 20 MMOL/L — HIGH (ref 5–17)
BASOPHILS # BLD AUTO: 0 K/UL — SIGNIFICANT CHANGE UP (ref 0–0.2)
BASOPHILS NFR BLD AUTO: 0.2 % — SIGNIFICANT CHANGE UP (ref 0–2)
BUN SERPL-MCNC: 15 MG/DL — SIGNIFICANT CHANGE UP (ref 8–20)
CALCIUM SERPL-MCNC: 8.1 MG/DL — LOW (ref 8.6–10.2)
CHLORIDE SERPL-SCNC: 99 MMOL/L — SIGNIFICANT CHANGE UP (ref 98–107)
CO2 SERPL-SCNC: 19 MMOL/L — LOW (ref 22–29)
CREAT SERPL-MCNC: 0.85 MG/DL — SIGNIFICANT CHANGE UP (ref 0.5–1.3)
CULTURE RESULTS: NO GROWTH — SIGNIFICANT CHANGE UP
EOSINOPHIL # BLD AUTO: 0 K/UL — SIGNIFICANT CHANGE UP (ref 0–0.5)
EOSINOPHIL NFR BLD AUTO: 0 % — SIGNIFICANT CHANGE UP (ref 0–6)
GLUCOSE SERPL-MCNC: 167 MG/DL — HIGH (ref 70–115)
HCT VFR BLD CALC: 46.5 % — SIGNIFICANT CHANGE UP (ref 37–47)
HGB BLD-MCNC: 15.4 G/DL — SIGNIFICANT CHANGE UP (ref 12–16)
INR BLD: 1.19 RATIO — HIGH (ref 0.88–1.16)
LYMPHOCYTES # BLD AUTO: 0.5 K/UL — LOW (ref 1–4.8)
LYMPHOCYTES # BLD AUTO: 3.9 % — LOW (ref 20–55)
MAGNESIUM SERPL-MCNC: 1.7 MG/DL — SIGNIFICANT CHANGE UP (ref 1.6–2.6)
MCHC RBC-ENTMCNC: 28.6 PG — SIGNIFICANT CHANGE UP (ref 27–31)
MCHC RBC-ENTMCNC: 33.1 G/DL — SIGNIFICANT CHANGE UP (ref 32–36)
MCV RBC AUTO: 86.4 FL — SIGNIFICANT CHANGE UP (ref 81–99)
METHOD TYPE: SIGNIFICANT CHANGE UP
METHOD TYPE: SIGNIFICANT CHANGE UP
MONOCYTES # BLD AUTO: 1.2 K/UL — HIGH (ref 0–0.8)
MONOCYTES NFR BLD AUTO: 9.6 % — SIGNIFICANT CHANGE UP (ref 3–10)
NEUTROPHILS # BLD AUTO: 11.2 K/UL — HIGH (ref 1.8–8)
NEUTROPHILS NFR BLD AUTO: 85.5 % — HIGH (ref 37–73)
PHOSPHATE SERPL-MCNC: 2.3 MG/DL — LOW (ref 2.4–4.7)
PLATELET # BLD AUTO: 405 K/UL — HIGH (ref 150–400)
POTASSIUM SERPL-MCNC: 3.7 MMOL/L — SIGNIFICANT CHANGE UP (ref 3.5–5.3)
POTASSIUM SERPL-SCNC: 3.7 MMOL/L — SIGNIFICANT CHANGE UP (ref 3.5–5.3)
PROTHROM AB SERPL-ACNC: 13.1 SEC — HIGH (ref 9.8–12.7)
RBC # BLD: 5.38 M/UL — HIGH (ref 4.4–5.2)
RBC # FLD: 14.7 % — SIGNIFICANT CHANGE UP (ref 11–15.6)
SODIUM SERPL-SCNC: 138 MMOL/L — SIGNIFICANT CHANGE UP (ref 135–145)
SPECIMEN SOURCE: SIGNIFICANT CHANGE UP
WBC # BLD: 13.1 K/UL — HIGH (ref 4.8–10.8)
WBC # FLD AUTO: 13.1 K/UL — HIGH (ref 4.8–10.8)

## 2018-08-09 PROCEDURE — 99222 1ST HOSP IP/OBS MODERATE 55: CPT

## 2018-08-09 PROCEDURE — 99233 SBSQ HOSP IP/OBS HIGH 50: CPT

## 2018-08-09 PROCEDURE — 71045 X-RAY EXAM CHEST 1 VIEW: CPT | Mod: 26

## 2018-08-09 RX ORDER — IPRATROPIUM/ALBUTEROL SULFATE 18-103MCG
3 AEROSOL WITH ADAPTER (GRAM) INHALATION ONCE
Qty: 0 | Refills: 0 | Status: COMPLETED | OUTPATIENT
Start: 2018-08-09 | End: 2018-08-09

## 2018-08-09 RX ORDER — FUROSEMIDE 40 MG
40 TABLET ORAL ONCE
Qty: 0 | Refills: 0 | Status: COMPLETED | OUTPATIENT
Start: 2018-08-09 | End: 2018-08-09

## 2018-08-09 RX ORDER — CEFTRIAXONE 500 MG/1
1 INJECTION, POWDER, FOR SOLUTION INTRAMUSCULAR; INTRAVENOUS EVERY 24 HOURS
Qty: 0 | Refills: 0 | Status: DISCONTINUED | OUTPATIENT
Start: 2018-08-10 | End: 2018-08-15

## 2018-08-09 RX ORDER — CEFTRIAXONE 500 MG/1
INJECTION, POWDER, FOR SOLUTION INTRAMUSCULAR; INTRAVENOUS
Qty: 0 | Refills: 0 | Status: DISCONTINUED | OUTPATIENT
Start: 2018-08-09 | End: 2018-08-15

## 2018-08-09 RX ORDER — CEFTRIAXONE 500 MG/1
1 INJECTION, POWDER, FOR SOLUTION INTRAMUSCULAR; INTRAVENOUS ONCE
Qty: 0 | Refills: 0 | Status: COMPLETED | OUTPATIENT
Start: 2018-08-09 | End: 2018-08-09

## 2018-08-09 RX ORDER — IPRATROPIUM/ALBUTEROL SULFATE 18-103MCG
3 AEROSOL WITH ADAPTER (GRAM) INHALATION EVERY 6 HOURS
Qty: 0 | Refills: 0 | Status: DISCONTINUED | OUTPATIENT
Start: 2018-08-09 | End: 2018-08-15

## 2018-08-09 RX ADMIN — Medication 3 MILLILITER(S): at 06:04

## 2018-08-09 RX ADMIN — CEFTRIAXONE 100 GRAM(S): 500 INJECTION, POWDER, FOR SOLUTION INTRAMUSCULAR; INTRAVENOUS at 13:55

## 2018-08-09 RX ADMIN — PANTOPRAZOLE SODIUM 40 MILLIGRAM(S): 20 TABLET, DELAYED RELEASE ORAL at 11:10

## 2018-08-09 RX ADMIN — Medication 48 MILLIGRAM(S): at 11:50

## 2018-08-09 RX ADMIN — ATORVASTATIN CALCIUM 40 MILLIGRAM(S): 80 TABLET, FILM COATED ORAL at 21:12

## 2018-08-09 RX ADMIN — Medication 250 MILLIGRAM(S): at 05:27

## 2018-08-09 RX ADMIN — PIPERACILLIN AND TAZOBACTAM 25 GRAM(S): 4; .5 INJECTION, POWDER, LYOPHILIZED, FOR SOLUTION INTRAVENOUS at 05:28

## 2018-08-09 RX ADMIN — ONDANSETRON 4 MILLIGRAM(S): 8 TABLET, FILM COATED ORAL at 11:10

## 2018-08-09 RX ADMIN — Medication 250 MILLIGRAM(S): at 17:19

## 2018-08-09 RX ADMIN — ONDANSETRON 4 MILLIGRAM(S): 8 TABLET, FILM COATED ORAL at 05:28

## 2018-08-09 RX ADMIN — Medication 3 MILLILITER(S): at 16:29

## 2018-08-09 RX ADMIN — ENOXAPARIN SODIUM 40 MILLIGRAM(S): 100 INJECTION SUBCUTANEOUS at 21:12

## 2018-08-09 RX ADMIN — ATENOLOL 100 MILLIGRAM(S): 25 TABLET ORAL at 05:26

## 2018-08-09 RX ADMIN — Medication 40 MILLIGRAM(S): at 11:10

## 2018-08-09 RX ADMIN — Medication 650 MILLIGRAM(S): at 00:19

## 2018-08-09 NOTE — CONSULT NOTE ADULT - SUBJECTIVE AND OBJECTIVE BOX
78 yr old female with hypertension, hyperlipidemia, myocardial infarction admitted with complaints of fever. She was in the ED with fever 104, headaches yesterday, was discharged home after being prescribed antibiotics for UTI. She returned with complaints of chills and feeling weak. Reports feeling well until 2 days ago, when she started having fevers and malaise. Denies sick contacts. She endorses loose stools yesterday, no abdominal pain. Poor appetite.              EXAM:  CT ABDOMEN AND PELVIS                          PROCEDURE DATE:  08/08/2018          INTERPRETATION:  [facility] RADIOLOGIST PRELIMINARY REPORT  Official report: MARIANNE Eugene M.D.  EXAM:    CT Abdomen and Pelvis Without Intravenous Contrast     EXAM DATE/TIME:    8/8/2018 6:53 PM     CLINICAL HISTORY:    78 years old, female; Pain; Other: Fever, gram neg bacteremia     TECHNIQUE:    Axial computed tomography images of the abdomen and pelvis without intravenous contrast.    All CT scans at this facility use at least one of these dose optimization techniques: automated exposure control; mA and/or kV adjustment per patient   size (includes targeted exams where dose is matched to clinical indication); or iterative reconstruction.  Coronal and sagittal reformatted images were created and reviewed.     COMPARISON:    No relevant prior studies available.     FINDINGS:    Lower thorax:  Small bilateral pleural effusions/atelectasis. Bibasilar peribronchial cuffing and interstitial prominence. Mild bibasilar emphysematous changes.     ABDOMEN:    Liver:  Hepatic steatosis.    Gallbladder and bile ducts: Normal. No calcified stones. No ductal   dilation.    Pancreas: Normal. No ductal dilation.    Spleen:  Mild splenomegaly.    Adrenals: Normal. No mass.    Kidneys and ureters:  Bilateral renal parapelvic cysts. No hydronephrosis. Atypical hypodensities within the renal pelvis and proximal ureters likely indicating calcium carbonate small stones . distal ureters normal in caliber.    Stomach and bowel:  Colonic diverticulosis with area of focal diverticulitis in the LEFT side of the cysts as sigmoid colon seen on coronal image 66 series 3 and axial image 109 series 2. There is mural thickening of the sigmoid colon may be related to  prior diverticulitis.    Appendix: No evidence of appendicitis.     PELVIS:    Bladder: Unremarkable as visualized.    Reproductive: Unremarkable as visualized.     ABDOMEN and PELVIS:    Intraperitoneal space:  Trace pelvic free fluid.    Bones/joints: No acute fracture. No dislocation. Degenerative changes of the thoracolumbar spine. Bilateral hip degenerative changes.   Soft tissues: Unremarkable.    Vasculature:  Aortic atherosclerotic disease without evidence of aneurysm.    Lymph nodes: Normal. No enlarged lymph nodes.     IMPRESSION:   Nephrolithiasis. Parapelvic renal cysts. No hydronephrosis.  No evidence of abdominal pelvic abscess.  1. Mild splenomegaly.   2. Hepatic steatosis.   3..Diverticulitis..   4. Small bilateral pleural effusions/atelectasis. Bibasilar peribronchial cuffing and interstitial prominence. Findings compatible with pulmonary edema.           NORM EUGENE M.D., ATTENDING RADIOLOGIST  This document has been electronically signed. Aug  9 2018  8:04AM      ========================     EXAM:  CT ANGIO CHEST (W)AW IC                          PROCEDURE DATE:  08/08/2018          INTERPRETATION:  CT ANGIO CHEST (W)AW IC    HISTORY:  Fever, chest pain    TECHNIQUE: Contrast enhanced CT pulmonary angiogram was performed. Multiplanar CT and HRCT images were reviewed. Maximum intensity projection (MIP) images are reconstructed as per CT angiography protocol. Images were acquired during the administration of 78 cc Omnipaque 350 IV contrast. This study was performed using automatic exposure control (radiation dose reduction software) to obtain a diagnostic image quality   scan with patient dose as low as reasonably achievable.    COMPARISON: Same day chest x-ray    PULMONARY ARTERIES: No pulmonary embolism.    LUNGS, AIRWAYS: The central airways are patent. Mild central bronchial wall thickening indicative of bronchitis. Background emphysema. Diffuse interstitial thickening throughout both lungs.    PLEURA: Trace bilateral pleural effusions.    HEART AND VESSELS: Normal heart size. No pericardial effusion. Coronary artery calcifications are present. Normal caliber thoracic aorta. Diffuse aortic atherosclerosis.    MEDIASTINUM, SAHARA, AXILLAE: No adenopathy.    UPPER ABDOMEN: Diffuse hepatic steatosis.    BONES AND CHEST WALL: No acute bony abnormality.    IMPRESSION:     No pulmonary embolism.    Diffuse bilateral interstitial thickening throughout the lungs with trace bilateral pleural effusions. Findings may reflect mild interstitial pulmonary edema versus atypical/viral infection.       ERAN SUMMERS M.D., ATTENDING RADIOLOGIST  This document has been electronically signed. Aug  8 2018  9:15AM [facility]  INFECTIOUS DISEASES AND INTERNAL MEDICINE at [place]  =======================================================  Elías Ramirez MD  Diplomates American Board of Internal Medicine and Infectious Diseases  =======================================================    [facility]-[id number]  ANNIE WALLS   This is a 78y  Female with hypertension, hyperlipidemia, CAD with prior MIs, who was seen in the ER on 18 for chills and rigors. One day prior, she had to leave the town pool early because of rigors and chills, could not get warm.  Her daughter called her at home, then called her physician who recommended ER visit.    During the initial ER visit, she was found with temp of 104.9, diagnosed with UTI and sent home on Keflex.    At home, she was not getting better. Was unable to fill medications sent to pharmacy and was brought in by EMS for fevers and malaise.   patient denies diarrhea currently, but ER notes report of it. Patient reports chronic back pain, but no recent pain in back or flanks.     Blood culture from initial ER visit showed E coli, bacteremia.   CT scan of abd showed nephrolithiasis, no hydronephrosis,  Focal area of diverticulitis also found.   currently, she feels better.     =======================================================  Past Medical & Surgical Hx:  =====================  PAST MEDICAL & SURGICAL HISTORY:  Vertigo  High cholesterol  MI (myocardial infarction): x2  HTN (hypertension)  FH: cholecystectomy      Problem List:  ==========  HEALTH ISSUES - PROBLEM Dx:  Bronchitis: Bronchitis  High cholesterol: High cholesterol  HTN (hypertension): HTN (hypertension)  Bacteremia: Bacteremia        Social Hx:  =======  no toxic habits currently  former smoker  30 years, 2 year - 62 years    FAMILY HISTORY:  Family history of essential hypertension (Father, Mother)    no significant family history of immunosuppressive disorders.    Allergies    No Known Allergies    Intolerances        MEDICATIONS  (STANDING):  ATENolol  Tablet 100 milliGRAM(s) Oral daily  atorvastatin 40 milliGRAM(s) Oral at bedtime  cefTRIAXone   IVPB      cefTRIAXone   IVPB 1 Gram(s) IV Intermittent once  enoxaparin Injectable 40 milliGRAM(s) SubCutaneous every 24 hours  fenofibrate Tablet 48 milliGRAM(s) Oral daily  ondansetron Injectable 4 milliGRAM(s) IV Push every 6 hours  pantoprazole  Injectable 40 milliGRAM(s) IV Push daily  saccharomyces boulardii 250 milliGRAM(s) Oral two times a day    MEDICATIONS  (PRN):  acetaminophen   Tablet 650 milliGRAM(s) Oral every 6 hours PRN For Temp greater than 38 C (100.4 F)  ALBUTerol/ipratropium for Nebulization 3 milliLiter(s) Nebulizer every 6 hours PRN Shortness of Breath and/or Wheezing        =======================================================  REVIEW OF SYSTEMS:  as above  all other ROS negative    ======================================================  Physical Exam:  ============  Vital Signs Last 24 Hrs  T(C): 36.5 (09 Aug 2018 04:18), Max: 38.7 (08 Aug 2018 16:30)  T(F): 97.7 (09 Aug 2018 04:18), Max: 101.6 (08 Aug 2018 16:30)  HR: 81 (09 Aug 2018 13:09) (71 - 81)  BP:  (09 Aug 2018 13:09) ( - 144/86)  BP(mean): --  RR: 20 (09 Aug 2018 04:18) (20 - 20)  SpO2: 97% (09 Aug 2018 04:18) (97% - 98%)      General:  No acute distress. pleasant  Eye: Pupils are equal, round and reactive to light, Extraocular movements are intact, Normal conjunctiva.  HENT: Normocephalic, Oral mucosa is moist, No pharyngeal erythema, No sinus tenderness.  Neck: Supple, No lymphadenopathy.  Respiratory: Lungs with prolong exp phase. no clear evidence of egophony  Cardiovascular: Normal rate, Regular rhythm, No murmur, Good pulses equal in all extremities, No edema.  Gastrointestinal: Soft, Non-tender, Non-distended, Normal bowel sounds.  Genitourinary: No costovertebral angle tenderness.  Lymphatics: No lymphadenopathy neck,   Musculoskeletal: Normal range of motion, Normal strength.  Integumentary: No rash.  Neurologic: Alert, Oriented, No focal deficits, Cranial Nerves II-XII are grossly intact.  Psychiatric: Appropriate mood & affect.      =======================================================  Labs:  ====      138  |  99  |  15.0  ----------------------------<  167<H>  3.7   |  19.0<L>  |  0.85    Ca    8.1<L>      09 Aug 2018 06:05  Phos  2.3       Mg     1.7         TPro  6.8  /  Alb  3.8  /  TBili  0.8  /  DBili  x   /  AST  60<H>  /  ALT  43<H>  /  AlkPhos  65                            15.4   13.1  )-----------( 405      ( 09 Aug 2018 06:05 )             46.5       PT/INR - ( 09 Aug 2018 06:05 )   PT: 13.1 sec;   INR: 1.19 ratio         PTT - ( 08 Aug 2018 06:31 )  PTT:30.3 sec  Urinalysis Basic - ( 08 Aug 2018 06:52 )    Color: Yellow / Appearance: Clear / S.010 / pH: x  Gluc: x / Ketone: Trace  / Bili: Negative / Urobili: Negative mg/dL   Blood: x / Protein: 30 mg/dL / Nitrite: Negative   Leuk Esterase: Trace / RBC: 0-2 /HPF / WBC 6-10   Sq Epi: x / Non Sq Epi: Occasional / Bacteria: Occasional      LIVER FUNCTIONS - ( 08 Aug 2018 06:31 )  Alb: 3.8 g/dL / Pro: 6.8 g/dL / ALK PHOS: 65 U/L / ALT: 43 U/L / AST: 60 U/L / GGT: x           CARDIAC MARKERS ( 07 Aug 2018 14:57 )  x     / <0.01 ng/mL / 69 U/L / x     / x          RECENT CULTURES:   @ 06:50 .Urine Clean Catch (Midstream)     No growth    Culture - Blood (18 @ 19:05)    -  Gentamicin: R >8    -  Imipenem: S <=1    -  Levofloxacin: R >4    -  Meropenem: S <=1    -  Piperacillin/Tazobactam: S <=16    -  Tigecycline: S <=2    -  Tobramycin: I 8    -  Trimethoprim/Sulfamethoxazole: R >2/38    -  Amikacin: S <=16    -  Ampicillin: R >16 These ampicillin results predict results for amoxicillin    -  Ampicillin/Sulbactam: S <=8/4    -  Aztreonam: S <=4    -  Cefazolin: S <=8    -  Cefepime: S <=4    -  Cefotaxime: S <=2    -  Cefoxitin: S <=8    -  Ceftazidime: S <=1    -  Ceftriaxone: S <=1 Enterobacter, Citrobacter, and Serratia may develop resistance during prolonged therapy    -  Ertapenem: S <=1    -  Cefuroxime: S <=4    -  Ciprofloxacin: R >2    Specimen Source: .Blood Blood    Organism: Escherichia coli    Culture Results:   Growth in anaerobic bottle: Escherichia coli  Anaerobic Bottle: 14:12 Hours to positivity  Aerobic Bottle: No growth to date  .  TYPE: (C=Critical, N=Notification, A=Abnormal) C  TESTS:  _ Positive Blood GS  DATE/TIME CALLED: _ 2018 09:45  CALLED TO: _ ERHR: Lyndsey Mustafa RN  READ BACK (2 Patient Identifiers)(Y/N): _ Y  READ BACK VALUES (Y/N): _ Y  CALLED BY: Debbie estrada    Organism Identification: Escherichia coli    Method Type: LESLY        =======================================     EXAM:  CT ABDOMEN AND PELVIS                          PROCEDURE DATE:  2018          INTERPRETATION:  [facility] RADIOLOGIST PRELIMINARY REPORT  Official report: MARIANNE Eugene M.D.  EXAM:    CT Abdomen and Pelvis Without Intravenous Contrast     EXAM DATE/TIME:    2018 6:53 PM     CLINICAL HISTORY:    78 years old, female; Pain; Other: Fever, gram neg bacteremia     TECHNIQUE:    Axial computed tomography images of the abdomen and pelvis without intravenous contrast.    All CT scans at this facility use at least one of these dose optimization techniques: automated exposure control; mA and/or kV adjustment per patient   size (includes targeted exams where dose is matched to clinical indication); or iterative reconstruction.  Coronal and sagittal reformatted images were created and reviewed.     COMPARISON:    No relevant prior studies available.     FINDINGS:    Lower thorax:  Small bilateral pleural effusions/atelectasis. Bibasilar peribronchial cuffing and interstitial prominence. Mild bibasilar emphysematous changes.     ABDOMEN:    Liver:  Hepatic steatosis.    Gallbladder and bile ducts: Normal. No calcified stones. No ductal dilation.    Pancreas: Normal. No ductal dilation.    Spleen:  Mild splenomegaly.    Adrenals: Normal. No mass.    Kidneys and ureters:  Bilateral renal parapelvic cysts. No hydronephrosis. Atypical hypodensities within the renal pelvis and proximal ureters likely indicating calcium carbonate small stones . distal ureters normal in caliber.    Stomach and bowel:  Colonic diverticulosis with area of focal diverticulitis in the LEFT side of the cysts as sigmoid colon seen on coronal image 66 series 3 and axial image 109 series 2. There is mural thickening of the sigmoid colon may be related to  prior diverticulitis.    Appendix: No evidence of appendicitis.     PELVIS:    Bladder: Unremarkable as visualized.    Reproductive: Unremarkable as visualized.     ABDOMEN and PELVIS:    Intraperitoneal space:  Trace pelvic free fluid.    Bones/joints: No acute fracture. No dislocation. Degenerative changes of the thoracolumbar spine. Bilateral hip degenerative changes.   Soft tissues: Unremarkable.    Vasculature:  Aortic atherosclerotic disease without evidence of aneurysm.    Lymph nodes: Normal. No enlarged lymph nodes.     IMPRESSION:   Nephrolithiasis. Parapelvic renal cysts. No hydronephrosis.  No evidence of abdominal pelvic abscess.  1. Mild splenomegaly.   2. Hepatic steatosis.   3..Diverticulitis..   4. Small bilateral pleural effusions/atelectasis. Bibasilar peribronchial cuffing and interstitial prominence. Findings compatible with pulmonary edema.           NORM EUGENE M.D., ATTENDING RADIOLOGIST  This document has been electronically signed. Aug  9 2018  8:04AM      ========================     EXAM:  CT ANGIO CHEST (W)AW IC                          PROCEDURE DATE:  2018          INTERPRETATION:  CT ANGIO CHEST (W)AW IC    HISTORY:  Fever, chest pain    TECHNIQUE: Contrast enhanced CT pulmonary angiogram was performed. Multiplanar CT and HRCT images were reviewed. Maximum intensity projection (MIP) images are reconstructed as per CT angiography protocol. Images were acquired during the administration of 78 cc Omnipaque 350 IV contrast. This study was performed using automatic exposure control (radiation dose reduction software) to obtain a diagnostic image quality   scan with patient dose as low as reasonably achievable.    COMPARISON: Same day chest x-ray  PULMONARY ARTERIES: No pulmonary embolism.  LUNGS, AIRWAYS: The central airways are patent. Mild central bronchial wall thickening indicative of bronchitis. Background emphysema. Diffuse interstitial thickening throughout both lungs.  PLEURA: Trace bilateral pleural effusions.  HEART AND VESSELS: Normal heart size. No pericardial effusion. Coronary artery calcifications are present. Normal caliber thoracic aorta. Diffuse aortic atherosclerosis.  MEDIASTINUM, SAHARA, AXILLAE: No adenopathy.  UPPER ABDOMEN: Diffuse hepatic steatosis.  BONES AND CHEST WALL: No acute bony abnormality.    IMPRESSION:     No pulmonary embolism.  Diffuse bilateral interstitial thickening throughout the lungs with trace bilateral pleural effusions. Findings may reflect mild interstitial pulmonary edema versus atypical/viral infection.       ERAN SUMMERS M.D., ATTENDING RADIOLOGIST  This document has been electronically signed. Aug  8 2018  9:15AM

## 2018-08-09 NOTE — SBIRT NOTE. - NSSBIRTSERVICES_GEN_A_ED_FT
spoke to [facility] radiologist CT read changed to diverticulitis, paged medicine team.

## 2018-08-09 NOTE — CONSULT NOTE ADULT - ASSESSMENT
This 78 y.o. F here for fevers and found with bacteremia, with E coli resistant to cipro.  Source likely from focal diverticulitis found on CT scan.  Unusual for patient to have pneumonia from this organism.    - repeat blood cultures today x 2   - once cx from blood negative, can place midline.  - will need 14 days of therapy.   - no suitable oral agents for this.

## 2018-08-09 NOTE — PROGRESS NOTE ADULT - SUBJECTIVE AND OBJECTIVE BOX
INTERVAL HPI/OVERNIGHT EVENTS:    CC: E coli bacteremia, sepsis secondary to diverticulitis, hypertension, hyperlipidemia    No abdominal pain, + diarrhea. Had shortness of breath early this morning, fluids were discontinued.     Vital Signs Last 24 Hrs  T(C): 36.5 (09 Aug 2018 04:18), Max: 38.7 (08 Aug 2018 16:30)  T(F): 97.7 (09 Aug 2018 04:18), Max: 101.6 (08 Aug 2018 16:30)  HR: 81 (09 Aug 2018 13:09) (71 - 81)  BP: 17/68 (09 Aug 2018 13:09) (17/68 - 144/86)  BP(mean): --  RR: 20 (09 Aug 2018 04:18) (20 - 20)  SpO2: 97% (09 Aug 2018 04:18) (97% - 98%)    PHYSICAL EXAM:    GENERAL: Not in distress, alert  CHEST/LUNG: b/l air entry, mild crackles in bases  HEART: Regular   ABDOMEN: Soft, BS+, non tender  EXTREMITIES:  No edema, tenderness.     MEDICATIONS  (STANDING):  ATENolol  Tablet 100 milliGRAM(s) Oral daily  atorvastatin 40 milliGRAM(s) Oral at bedtime  cefTRIAXone   IVPB      enoxaparin Injectable 40 milliGRAM(s) SubCutaneous every 24 hours  fenofibrate Tablet 48 milliGRAM(s) Oral daily  ondansetron Injectable 4 milliGRAM(s) IV Push every 6 hours  pantoprazole  Injectable 40 milliGRAM(s) IV Push daily  saccharomyces boulardii 250 milliGRAM(s) Oral two times a day    MEDICATIONS  (PRN):  acetaminophen   Tablet 650 milliGRAM(s) Oral every 6 hours PRN For Temp greater than 38 C (100.4 F)  ALBUTerol/ipratropium for Nebulization 3 milliLiter(s) Nebulizer every 6 hours PRN Shortness of Breath and/or Wheezing      Allergies    No Known Allergies    Intolerances          LABS:                          15.4   13.1  )-----------( 405      ( 09 Aug 2018 06:05 )             46.5     08-    138  |  99  |  15.0  ----------------------------<  167<H>  3.7   |  19.0<L>  |  0.85    Ca    8.1<L>      09 Aug 2018 06:05  Phos  2.3     -  Mg     1.7     -    TPro  6.8  /  Alb  3.8  /  TBili  0.8  /  DBili  x   /  AST  60<H>  /  ALT  43<H>  /  AlkPhos  65  08-    PT/INR - ( 09 Aug 2018 06:05 )   PT: 13.1 sec;   INR: 1.19 ratio         PTT - ( 08 Aug 2018 06:31 )  PTT:30.3 sec  Urinalysis Basic - ( 08 Aug 2018 06:52 )    Color: Yellow / Appearance: Clear / S.010 / pH: x  Gluc: x / Ketone: Trace  / Bili: Negative / Urobili: Negative mg/dL   Blood: x / Protein: 30 mg/dL / Nitrite: Negative   Leuk Esterase: Trace / RBC: 0-2 /HPF / WBC 6-10   Sq Epi: x / Non Sq Epi: Occasional / Bacteria: Occasional        RADIOLOGY & ADDITIONAL TESTS:

## 2018-08-09 NOTE — PROGRESS NOTE ADULT - ASSESSMENT
78 yr old female with hypertension, hyperlipidemia, myocardial infarction admitted with complaints of fever. She was recently discharged from ED for UTI, returned with worsening symptoms, fever and chills. Noted to have gram negative bacteremia, positive UA. She had a CT chest suggestive of bronchitis. CT abdomen was ordered, showed diverticulitis. ID was consulted, her blood cultures grew E coli resistant to Ciprofloxacin. She was started on Ceftriaxone.

## 2018-08-10 ENCOUNTER — TRANSCRIPTION ENCOUNTER (OUTPATIENT)
Age: 78
End: 2018-08-10

## 2018-08-10 DIAGNOSIS — E83.39 OTHER DISORDERS OF PHOSPHORUS METABOLISM: ICD-10-CM

## 2018-08-10 DIAGNOSIS — E87.6 HYPOKALEMIA: ICD-10-CM

## 2018-08-10 LAB
ANION GAP SERPL CALC-SCNC: 16 MMOL/L — SIGNIFICANT CHANGE UP (ref 5–17)
BUN SERPL-MCNC: 18 MG/DL — SIGNIFICANT CHANGE UP (ref 8–20)
CALCIUM SERPL-MCNC: 8 MG/DL — LOW (ref 8.6–10.2)
CHLORIDE SERPL-SCNC: 100 MMOL/L — SIGNIFICANT CHANGE UP (ref 98–107)
CO2 SERPL-SCNC: 26 MMOL/L — SIGNIFICANT CHANGE UP (ref 22–29)
CREAT SERPL-MCNC: 0.89 MG/DL — SIGNIFICANT CHANGE UP (ref 0.5–1.3)
GLUCOSE SERPL-MCNC: 142 MG/DL — HIGH (ref 70–115)
HCT VFR BLD CALC: 38.7 % — SIGNIFICANT CHANGE UP (ref 37–47)
HGB BLD-MCNC: 12.5 G/DL — SIGNIFICANT CHANGE UP (ref 12–16)
MAGNESIUM SERPL-MCNC: 1.7 MG/DL — SIGNIFICANT CHANGE UP (ref 1.6–2.6)
MCHC RBC-ENTMCNC: 28.2 PG — SIGNIFICANT CHANGE UP (ref 27–31)
MCHC RBC-ENTMCNC: 32.3 G/DL — SIGNIFICANT CHANGE UP (ref 32–36)
MCV RBC AUTO: 87.4 FL — SIGNIFICANT CHANGE UP (ref 81–99)
PHOSPHATE SERPL-MCNC: 1.1 MG/DL — LOW (ref 2.4–4.7)
PLATELET # BLD AUTO: 379 K/UL — SIGNIFICANT CHANGE UP (ref 150–400)
POTASSIUM SERPL-MCNC: 2.8 MMOL/L — CRITICAL LOW (ref 3.5–5.3)
POTASSIUM SERPL-SCNC: 2.8 MMOL/L — CRITICAL LOW (ref 3.5–5.3)
RBC # BLD: 4.43 M/UL — SIGNIFICANT CHANGE UP (ref 4.4–5.2)
RBC # FLD: 14.6 % — SIGNIFICANT CHANGE UP (ref 11–15.6)
SODIUM SERPL-SCNC: 142 MMOL/L — SIGNIFICANT CHANGE UP (ref 135–145)
WBC # BLD: 11.5 K/UL — HIGH (ref 4.8–10.8)
WBC # FLD AUTO: 11.5 K/UL — HIGH (ref 4.8–10.8)

## 2018-08-10 PROCEDURE — 99233 SBSQ HOSP IP/OBS HIGH 50: CPT

## 2018-08-10 PROCEDURE — 99223 1ST HOSP IP/OBS HIGH 75: CPT

## 2018-08-10 PROCEDURE — 99232 SBSQ HOSP IP/OBS MODERATE 35: CPT

## 2018-08-10 RX ORDER — PANTOPRAZOLE SODIUM 20 MG/1
40 TABLET, DELAYED RELEASE ORAL
Qty: 0 | Refills: 0 | Status: DISCONTINUED | OUTPATIENT
Start: 2018-08-10 | End: 2018-08-10

## 2018-08-10 RX ORDER — PANTOPRAZOLE SODIUM 20 MG/1
40 TABLET, DELAYED RELEASE ORAL
Qty: 0 | Refills: 0 | Status: DISCONTINUED | OUTPATIENT
Start: 2018-08-10 | End: 2018-08-15

## 2018-08-10 RX ORDER — SODIUM CHLORIDE 9 MG/ML
1000 INJECTION, SOLUTION INTRAVENOUS
Qty: 0 | Refills: 0 | Status: DISCONTINUED | OUTPATIENT
Start: 2018-08-10 | End: 2018-08-11

## 2018-08-10 RX ORDER — POTASSIUM CHLORIDE 20 MEQ
40 PACKET (EA) ORAL EVERY 4 HOURS
Qty: 0 | Refills: 0 | Status: COMPLETED | OUTPATIENT
Start: 2018-08-10 | End: 2018-08-10

## 2018-08-10 RX ORDER — CEFTRIAXONE 500 MG/1
1 INJECTION, POWDER, FOR SOLUTION INTRAMUSCULAR; INTRAVENOUS
Qty: 12 | Refills: 0 | OUTPATIENT
Start: 2018-08-10 | End: 2018-08-21

## 2018-08-10 RX ORDER — POTASSIUM PHOSPHATE, MONOBASIC POTASSIUM PHOSPHATE, DIBASIC 236; 224 MG/ML; MG/ML
15 INJECTION, SOLUTION INTRAVENOUS ONCE
Qty: 0 | Refills: 0 | Status: COMPLETED | OUTPATIENT
Start: 2018-08-10 | End: 2018-08-10

## 2018-08-10 RX ADMIN — ATORVASTATIN CALCIUM 40 MILLIGRAM(S): 80 TABLET, FILM COATED ORAL at 22:17

## 2018-08-10 RX ADMIN — Medication 250 MILLIGRAM(S): at 15:54

## 2018-08-10 RX ADMIN — SODIUM CHLORIDE 80 MILLILITER(S): 9 INJECTION, SOLUTION INTRAVENOUS at 12:43

## 2018-08-10 RX ADMIN — Medication 40 MILLIEQUIVALENT(S): at 12:43

## 2018-08-10 RX ADMIN — Medication 250 MILLIGRAM(S): at 05:21

## 2018-08-10 RX ADMIN — Medication 48 MILLIGRAM(S): at 15:54

## 2018-08-10 RX ADMIN — PANTOPRAZOLE SODIUM 40 MILLIGRAM(S): 20 TABLET, DELAYED RELEASE ORAL at 12:43

## 2018-08-10 RX ADMIN — Medication 40 MILLIEQUIVALENT(S): at 15:54

## 2018-08-10 RX ADMIN — ENOXAPARIN SODIUM 40 MILLIGRAM(S): 100 INJECTION SUBCUTANEOUS at 22:16

## 2018-08-10 RX ADMIN — POTASSIUM PHOSPHATE, MONOBASIC POTASSIUM PHOSPHATE, DIBASIC 62.5 MILLIMOLE(S): 236; 224 INJECTION, SOLUTION INTRAVENOUS at 12:43

## 2018-08-10 RX ADMIN — CEFTRIAXONE 100 GRAM(S): 500 INJECTION, POWDER, FOR SOLUTION INTRAMUSCULAR; INTRAVENOUS at 12:42

## 2018-08-10 RX ADMIN — ATENOLOL 100 MILLIGRAM(S): 25 TABLET ORAL at 05:21

## 2018-08-10 NOTE — PROGRESS NOTE ADULT - PROBLEM SELECTOR PLAN 1
ID consulted, antibiotics changed, repeat blood cultures negative, needs mid line Ceftriaxone till 8/21/18

## 2018-08-10 NOTE — DISCHARGE NOTE ADULT - CARE PROVIDER_API CALL
Juan Manuel Wellington), Family Medicine  [address] [place] [address]  Phone: [phone]  Fax: [phone] Juan Manuel Wellington), Family Medicine  [facility] [place] [address]  Phone: [phone]  Fax: [phone]    Rashad Hamilton), Gastroenterology; Internal Medicine  [address] [facility] [address]  [place] [address]  Phone: [phone]  Fax: [phone] Juan Manuel Wellington), Family Medicine  [facility] [place] [address]  Phone: [phone]  Fax: [phone]    Rashad Hamilton), Gastroenterology; Internal Medicine  [address] [facility] [address]  [place] [address]  Phone: [phone]  Fax: [phone]    Juan Manuel Mccartney), Infectious Disease; Internal Medicine  [address] [facility] [address]  [place] [address]  Phone: [phone]  Fax: [phone] Juan Manuel Wellington), Family Medicine  [address] [place] [address]  Phone: [phone]  Fax: [phone]    Rashad Hamilton), Gastroenterology; Internal Medicine  [address] [facility]  [address]  [place] [address]  Phone: [phone]  Fax: [phone]    Juan Manuel Mccartney), Infectious Disease; Internal Medicine  [address] [facility]  [address]  [place] [address]  Phone: [phone]  Fax: [phone]    Jaye Wise), Cardiovascular Disease; Internal Medicine  [address] [facility]  [address]  [place] [address]  Phone: [phone]  Fax: [phone]

## 2018-08-10 NOTE — BRIEF OPERATIVE NOTE - PROCEDURE
<<-----Click on this checkbox to enter Procedure PEG (percutaneous endoscopic gastrostomy)  08/10/2018    Active  [id number]

## 2018-08-10 NOTE — PROGRESS NOTE ADULT - SUBJECTIVE AND OBJECTIVE BOX
INTERVAL HPI/OVERNIGHT EVENTS:    CC: E coli bacteremia, diverticulitis, hypertension, hyperlipidemia, hypokalemia, hypophosphatemia    No overnight events, diarrhea +, no fever, no abdominal pain. Shortness of breath improved.    Vital Signs Last 24 Hrs  T(C): 36.6 (10 Aug 2018 08:25), Max: 37.3 (09 Aug 2018 23:32)  T(F): 97.8 (10 Aug 2018 08:25), Max: 99.2 (09 Aug 2018 23:32)  HR: 73 (10 Aug 2018 08:25) (68 - 92)  BP: 131/78 (10 Aug 2018 08:25) (93/54 - 158/86)  BP(mean): --  RR: 18 (10 Aug 2018 08:25) (18 - 20)  SpO2: 97% (10 Aug 2018 08:25) (93% - 97%)    PHYSICAL EXAM:    GENERAL: Not in distress, alert  CHEST/LUNG: b/l air entry, clear  HEART: Regular  ABDOMEN: Soft, Nontender, BS+  EXTREMITIES:  No edema, tenderness.    MEDICATIONS  (STANDING):  ATENolol  Tablet 100 milliGRAM(s) Oral daily  atorvastatin 40 milliGRAM(s) Oral at bedtime  cefTRIAXone   IVPB      cefTRIAXone   IVPB 1 Gram(s) IV Intermittent every 24 hours  dextrose 5% + sodium chloride 0.45% 1000 milliLiter(s) (80 mL/Hr) IV Continuous <Continuous>  enoxaparin Injectable 40 milliGRAM(s) SubCutaneous every 24 hours  fenofibrate Tablet 48 milliGRAM(s) Oral daily  ondansetron Injectable 4 milliGRAM(s) IV Push every 6 hours  pantoprazole    Tablet 40 milliGRAM(s) Oral before breakfast  potassium chloride   Powder 40 milliEquivalent(s) Oral every 4 hours  potassium phosphate IVPB 15 milliMole(s) IV Intermittent once  saccharomyces boulardii 250 milliGRAM(s) Oral two times a day    MEDICATIONS  (PRN):  acetaminophen   Tablet 650 milliGRAM(s) Oral every 6 hours PRN For Temp greater than 38 C (100.4 F)  ALBUTerol/ipratropium for Nebulization 3 milliLiter(s) Nebulizer every 6 hours PRN Shortness of Breath and/or Wheezing      Allergies    No Known Allergies    Intolerances          LABS:                          12.5   11.5  )-----------( 379      ( 10 Aug 2018 07:25 )             38.7     08-10    142  |  100  |  18.0  ----------------------------<  142<H>  2.8<LL>   |  26.0  |  0.89    Ca    8.0<L>      10 Aug 2018 07:25  Phos  1.1     08-10  Mg     1.7     08-10      PT/INR - ( 09 Aug 2018 06:05 )   PT: 13.1 sec;   INR: 1.19 ratio               RADIOLOGY & ADDITIONAL TESTS:

## 2018-08-10 NOTE — DISCHARGE NOTE ADULT - PLAN OF CARE
resolving Continue with meds as directed. Follow up with your primary doctor in 1 week & Gastroenterology.  F/U with  as outpatient as you will need colonoscopy in 2-3 months following resultion of diverticulitis. Continue with meds as directed. Follow up with your primary doctor & GI Continue with meds as directed. Follow up with your primary doctor in 1 week Continue with meds as directed. Follow up with your primary doctor in 1 week & Gastroenterology.  F/U with  as outpatient as you will need colonoscopy in 2-3 months following resolution of diverticulitis. Continue with meds as directed. Follow up with your primary doctor and Dr. Mccartney infectious disease in 2 weeks likely iatrogenic. s/p lasix. Stop lasix & use losartan-hydrochlorothiazide instead as per cardiology. Follow up with your cardiologist

## 2018-08-10 NOTE — PROGRESS NOTE ADULT - ASSESSMENT
This 78 y.o. F here for fevers and found with bacteremia, with E coli resistant to cipro.  Source likely from focal diverticulitis found on CT scan.  Unusual for patient to have pneumonia from this organism.    - repeat blood cultures8/8/18 prelim negative  will order midline.  - will need 14 days of therapy.   - no suitable oral agents for this.   - end date 8/21/18

## 2018-08-10 NOTE — DISCHARGE NOTE ADULT - PROVIDER TOKENS
TOKEN:'[id number]' TOKEN:'[id number]',TOKEN:'[id number]' TOKEN:'[id number]',TOKEN:'[id number]',TOKEN:'[id number]' TOKEN:'[id number]',[id number]',TOKEN:'[id number]'

## 2018-08-10 NOTE — CONSULT NOTE ADULT - ASSESSMENT
In summary, this is a 78-year-old woman with diverticulitis and secondary Escherichia coli bacteremia, currently being treated with ceftriaxone, and convalescing well.  Antibiosis per infectious disease consultation.  Patient will need a colonoscopy in 6-8 weeks, which I discussed with her.  Please ensure that she has a scheduled followup prior to discharge if possible.    Okay to advance patient to low residue diet.    Thank you for the consult.  Please call with any questions or concerns period.    HERLINDA Garrido MD  [facility]  Gastroenterology at [place]  [phone]

## 2018-08-10 NOTE — PROGRESS NOTE ADULT - ASSESSMENT
78 yr old female with hypertension, hyperlipidemia, myocardial infarction admitted with complaints of fever. She was recently discharged from ED for UTI, returned with worsening symptoms, fever and chills. Noted to have gram negative bacteremia, positive UA. She had a CT chest suggestive of bronchitis. CT abdomen was ordered, showed diverticulitis. ID was consulted, her blood cultures grew E coli resistant to Ciprofloxacin. She was started on Ceftriaxone. Repeat blood cultures negative, mid line requested for IV antibiotics at home. Her diet was advanced to full liquids. GI consulted.

## 2018-08-10 NOTE — DISCHARGE NOTE ADULT - CARE PLAN
Principal Discharge DX:	Acute diverticulitis  Secondary Diagnosis:	E coli bacteremia  Secondary Diagnosis:	High cholesterol  Secondary Diagnosis:	HTN (hypertension) Principal Discharge DX:	Acute diverticulitis  Goal:	resolving  Assessment and plan of treatment:	Continue with meds as directed. Follow up with your primary doctor in 1 week & Gastroenterology.  F/U with  as outpatient as you will need colonoscopy in 2-3 months following resultion of diverticulitis.  Secondary Diagnosis:	E coli bacteremia  Assessment and plan of treatment:	Continue with meds as directed. Follow up with your primary doctor & GI  Secondary Diagnosis:	High cholesterol  Assessment and plan of treatment:	Continue with meds as directed. Follow up with your primary doctor in 1 week  Secondary Diagnosis:	HTN (hypertension)  Assessment and plan of treatment:	Continue with meds as directed. Follow up with your primary doctor in 1 week Principal Discharge DX:	Acute diverticulitis  Goal:	resolving  Assessment and plan of treatment:	Continue with meds as directed. Follow up with your primary doctor in 1 week & Gastroenterology.  F/U with  as outpatient as you will need colonoscopy in 2-3 months following resolution of diverticulitis.  Secondary Diagnosis:	E coli bacteremia  Assessment and plan of treatment:	Continue with meds as directed. Follow up with your primary doctor and Dr. Mccartney infectious disease in 2 weeks  Secondary Diagnosis:	High cholesterol  Assessment and plan of treatment:	Continue with meds as directed. Follow up with your primary doctor in 1 week  Secondary Diagnosis:	HTN (hypertension)  Assessment and plan of treatment:	Continue with meds as directed. Follow up with your primary doctor in 1 week Principal Discharge DX:	Acute diverticulitis  Goal:	resolving  Assessment and plan of treatment:	Continue with meds as directed. Follow up with your primary doctor in 1 week & Gastroenterology.  F/U with  as outpatient as you will need colonoscopy in 2-3 months following resolution of diverticulitis.  Secondary Diagnosis:	E coli bacteremia  Assessment and plan of treatment:	Continue with meds as directed. Follow up with your primary doctor and Dr. Mccartney infectious disease in 2 weeks  Secondary Diagnosis:	High cholesterol  Assessment and plan of treatment:	Continue with meds as directed. Follow up with your primary doctor in 1 week  Secondary Diagnosis:	HTN (hypertension)  Assessment and plan of treatment:	Continue with meds as directed. Follow up with your primary doctor in 1 week  Secondary Diagnosis:	Fluid overload  Assessment and plan of treatment:	likely iatrogenic. s/p lasix. Stop lasix & use losartan-hydrochlorothiazide instead as per cardiology. Follow up with your cardiologist

## 2018-08-10 NOTE — DISCHARGE NOTE ADULT - PATIENT PORTAL LINK FT
You can access the [facility] Portal, offered by [facility], by registering with the following website: [email or web address]

## 2018-08-10 NOTE — BRIEF OPERATIVE NOTE - OPERATION/FINDINGS
severe acid reflux esophagitis through esophagus as well as antral gastritis with edematous erythematous antral folds and severe erosive bulbar duodenitis. There was alos either a zenkers diverticulum or stricture at the UES making intubation of the esophagus difficult but We were ultimately able to do so and a 20F G tube was placed.

## 2018-08-10 NOTE — PROGRESS NOTE ADULT - SUBJECTIVE AND OBJECTIVE BOX
[facility]  INFECTIOUS DISEASES AND INTERNAL MEDICINE at [place]  =======================================================  Elías Ramirez MD  Diplomates American Board of Internal Medicine and Infectious Diseases  =======================================================    ANNIE WALLS [id number]  chief complaint:  follow up on E coli bacteremia  patient seen and examined in follow up.  Chart and labs reviewed.     repeat blood cx 8/8/18 negative, prelim.   no fevers  WBC down trending.     =======================================================  Allergies:  No Known Allergies      =======================================================  Medications:  acetaminophen   Tablet 650 milliGRAM(s) Oral every 6 hours PRN  ALBUTerol/ipratropium for Nebulization 3 milliLiter(s) Nebulizer every 6 hours PRN  ATENolol  Tablet 100 milliGRAM(s) Oral daily  atorvastatin 40 milliGRAM(s) Oral at bedtime  cefTRIAXone   IVPB      cefTRIAXone   IVPB 1 Gram(s) IV Intermittent every 24 hours  dextrose 5% + sodium chloride 0.45% 1000 milliLiter(s) IV Continuous <Continuous>  enoxaparin Injectable 40 milliGRAM(s) SubCutaneous every 24 hours  fenofibrate Tablet 48 milliGRAM(s) Oral daily  ondansetron Injectable 4 milliGRAM(s) IV Push every 6 hours  pantoprazole    Tablet 40 milliGRAM(s) Oral before breakfast  potassium chloride   Powder 40 milliEquivalent(s) Oral every 4 hours  potassium phosphate IVPB 15 milliMole(s) IV Intermittent once  saccharomyces boulardii 250 milliGRAM(s) Oral two times a day       =======================================================     REVIEW OF SYSTEMS:  as above  all other ROS are negative    =======================================================    Physical Exam:    Vital Signs Last 24 Hrs  T(C): 36.6 (10 Aug 2018 08:25), Max: 37.3 (09 Aug 2018 23:32)  T(F): 97.8 (10 Aug 2018 08:25), Max: 99.2 (09 Aug 2018 23:32)  HR: 73 (10 Aug 2018 08:25) (68 - 92)  BP: 131/78 (10 Aug 2018 08:25) (93/54 - 158/86)  RR: 18 (10 Aug 2018 08:25) (18 - 20)  SpO2: 97% (10 Aug 2018 08:25) (93% - 97%)    GEN: NAD, pleasant  HEENT: normocephalic and atraumatic. EOMI. ANNY.    NECK: Supple. No carotid bruits.  No lymphadenopathy or thyromegaly.  LUNGS: Clear to auscultation.  HEART: Regular rate and rhythm    ABDOMEN: Soft, nontender, and nondistended.  Positive bowel sounds.    : No CVA tenderness  EXTREMITIES: Without any cyanosis, clubbing, rash, lesions or edema.  MSK: no joint swelling  NEUROLOGIC: Cranial nerves II through XII are grossly intact.  PSYCHIATRIC: Appropriate affect .  SKIN: No ulceration or induration present.    =======================================================    Labs:  08-10    142  |  100  |  18.0  ----------------------------<  142<H>  2.8<LL>   |  26.0  |  0.89    Ca    8.0<L>      10 Aug 2018 07:25  Phos  1.1     08-10  Mg     1.7     08-10                            12.5   11.5  )-----------( 379      ( 10 Aug 2018 07:25 )             38.7       PT/INR - ( 09 Aug 2018 06:05 )   PT: 13.1 sec;   INR: 1.19 ratio            CAPILLARY BLOOD GLUCOSE            RECENT CULTURES:  08-08 @ 07:20 .Blood Blood-Peripheral     No growth at 48 hours        08-08 @ 07:19 .Blood Blood-Peripheral     No growth at 48 hours        08-08 @ 06:50 .Urine Clean Catch (Midstream)     No growth        08-07 @ 19:05 .Blood Blood Escherichia coli  Blood Culture PCR    Growth in anaerobic bottle: Escherichia coli  Anaerobic Bottle: 11:51 Hours to positivity  Aerobic Bottle: No growth to date  .  ***Blood Panel PCR results on this specimen are available  approximately 3 hours after the Gram stain result.***  Gram stain, PCR, and/or culture results may not always  correspond due to difference in methodologies.  ************************************************************  This PCR assay was performed using [employer].  The following targets are tested for: Enterococcus,  vancomycin resistant enterococci, Listeria monocytogenes,  coagulase negative staphylococci, S. aureus,  methicillin resistant S. aureus, Streptococcus agalactiae  (Group B), S. pneumoniae, S. pyogenes (Group A),  Acinetobacter baumannii, Enterobacter cloacae, E. coli,  Klebsiella oxytoca, K. pneumoniae, Proteus sp.,  Serratia marcescens, Haemophilus influenzae,  Neisseria meningitidis, Pseudomonas aeruginosa, Candida  albicans, C. glabrata, C krusei, C parapsilosis,  C. tropicalis and the KPC resistance gene.  "Due to technical problems, Proteus sp. will Not be reported as part of  the BCID panel until further notice"  .  TYPE: (C=Critical, N=Notification, A=Abnormal) C  TESTS:  _ Positive Blood GS  DATE/TIME CALLED: _ 08/08/2018 09:45  CALLED TO: _ ERHR: Lyndsey Mustafa RN  READ BACK (2 Patient Identifiers)(Y/N): _ Y  READ BACK VALUES (Y/N): _ Y  CALLED BY: Debbie estrada

## 2018-08-10 NOTE — DISCHARGE NOTE ADULT - MEDICATION SUMMARY - MEDICATIONS TO TAKE
I will START or STAY ON the medications listed below when I get home from the hospital:    fenofibrate 48 mg oral tablet  -- 1 tab(s) by mouth once a day  -- Indication: For Hld    atorvastatin 40 mg oral tablet  -- 1 tab(s) by mouth once a day  -- Indication: For Hld    losartan-hydroCHLOROthiazide 100 mg-25 mg oral tablet  -- 1 tab(s) by mouth once a day  -- Indication: For HTN (hypertension)    atenolol 100 mg oral tablet  -- 1 tab(s) by mouth once a day  -- Indication: For HTN (hypertension)    cefTRIAXone 1 g injection  -- 1 gram(s) intravenously once a day MDD:end date 8/21/18; weekly CBC diff, CMP faxed - [phone]  -- Indication: For Bacteremia    saccharomyces boulardii lyo 250 mg oral capsule  -- 1 cap(s) by mouth 2 times a day  -- Indication: For Ppx    pantoprazole 40 mg oral delayed release tablet  -- 1 tab(s) by mouth once a day (before a meal)  -- Indication: For gerd

## 2018-08-10 NOTE — DISCHARGE NOTE ADULT - HOSPITAL COURSE
78 yr old female with hypertension, hyperlipidemia, myocardial infarction admitted with complaints of fever. She was recently discharged from ED for UTI, returned with worsening symptoms, fever and chills. Noted to have gram negative bacteremia, positive UA. She had a CT chest suggestive of bronchitis. CT abdomen was ordered, showed diverticulitis. ID was consulted, her blood cultures grew E coli resistant to Ciprofloxacin. She was started on Ceftriaxone. Repeat blood cultures negative, mid line requested for IV antibiotics at home. Her diet was advanced to full liquids. GI consulted, advised outpatient colonoscopy in 4-6 weeks. 78 yr old female with hypertension, hyperlipidemia, myocardial infarction admitted with complaints of fever. She was recently discharged from ED for UTI, returned with worsening symptoms, fever and chills. Noted to have gram negative bacteremia, positive UA. She had a CT chest suggestive of bronchitis. CT abdomen was ordered, showed diverticulitis. ID was consulted, her blood cultures grew E coli resistant to Ciprofloxacin. She was started on Ceftriaxone. Repeat blood cultures negative, mid line requested for IV antibiotics at home. Her diet was advanced to full liquids. GI consulted, advised outpatient colonoscopy in 4-6 weeks. Hospital course complicated by iatrogenic fluid overload, IV alsix given, cardio consult agreed with plan. Pt responded well to diuresis & now stabel for d/c home. Cleared by cardio & GI for d/c. VSS. Medically stable for d/c 78 yr old female with hypertension, hyperlipidemia, myocardial infarction admitted with complaints of fever. She was recently discharged from ED for UTI, returned with worsening symptoms, fever and chills. Noted to have gram negative bacteremia, positive UA. She had a CT chest suggestive of bronchitis. CT abdomen was ordered, showed diverticulitis. ID was consulted, her blood cultures grew E coli resistant to Ciprofloxacin. She was started on Ceftriaxone. Repeat blood cultures negative, mid line requested for IV antibiotics at home. Her diet was advanced to full liquids. GI consulted, advised outpatient colonoscopy in 4-6 weeks. Hospital course complicated by iatrogenic fluid overload, IV alsix given, cardio consult agreed with plan. Pt responded well to diuresis. Echo completed, noted with decreased LVF with preserved EF, Dr. Adams made aware. pt to follow up [facility] Dr. Adams as outpatient. . Cleared by cardio & GI for d/c. VSS. Medically stable for d/c 78 yr old female with hypertension, hyperlipidemia, myocardial infarction admitted with complaints of fever. She was recently discharged from ED for UTI, returned with worsening symptoms, fever and chills. Noted to have gram negative bacteremia, positive UA. She had a CT chest suggestive of bronchitis. CT abdomen was ordered, showed diverticulitis. ID was consulted, her blood cultures grew E coli resistant to Ciprofloxacin. She was started on Ceftriaxone. Repeat blood cultures negative, mid line requested for IV antibiotics at home. Her diet was advanced to full liquids. GI consulted, advised outpatient colonoscopy in 4-6 weeks. Hospital course complicated by iatrogenic fluid overload, IV Lasix given, cardio consult agreed with plan. Pt responded well to diuresis. Echo completed, noted with decreased LVF with preserved EF, Dr. Adams made aware. pt to follow up wtih Dr. Adams as outpatient. . Cleared by cardio & GI for d/c. VSS. Medically stable for d/c . Lasix to be discontinued at discharge and home medications resumed. Pt had midline placed yesterday and is now pending the infusion company arrival at [facility] to teach pt and her daughter how to administer the ABX,. If they are comfortable with administering the medication, pt will be discharged to [facility] today- 8/15/18. Pt is in NAD, pt understands the plan of follow up which is ABX through 8/21/18 and to see ID and PMD as well as cardiology and GI within one week. Pt has been given opportunity to ask questions about her plan of care and all questions have been answered. Pt feels well, tolerating full diet. VSS and NAD.   General: NAD, sitting up in chair, well groomed in nightgown  Eyes: PERRLA, EOM intact  Neck: Supple and symmetrical, no JVD  CV: RRR, no m/r/g  Lungs: CTA b/l, no use of accessory muscles, no wheezes, rales, rhonchi  Skin: warm, dry, no rashes  Psych: normal mood/affect  Abdomen: Normal BS, soft, NT/ND  Extremities: no edema, cyanosis  Musculoskeletal: good strength b/l UE/LE, normal ROM, no joint swelling  Neuro: A & O X 3, CN 2-12 grossly intact, no sensory or motor deficit. 78 yr old female with hypertension, hyperlipidemia, myocardial infarction admitted with complaints of fever. She was recently discharged from ED for UTI, returned with worsening symptoms, fever and chills. Noted to have gram negative bacteremia, positive UA. She had a CT chest suggestive of bronchitis. CT abdomen was ordered, showed diverticulitis. ID was consulted, her blood cultures grew E coli resistant to Ciprofloxacin. She was started on Ceftriaxone. Repeat blood cultures negative, mid line requested for IV antibiotics at home. Her diet was advanced to full liquids. GI consulted, advised outpatient colonoscopy in 4-6 weeks. Hospital course complicated by iatrogenic fluid overload, IV Lasix given, cardio consult agreed with plan. Pt responded well to diuresis. Echo completed, noted with wall motion abnl with preserved EF, Dr. Adams made aware. pt to follow up wtih Dr. Adams as outpatient. . Cleared by cardio & GI for d/c. VSS. Medically stable for d/c . Lasix to be discontinued at discharge and home medications resumed. Pt had midline placed yesterday and is now pending the infusion company arrival at [facility] to teach pt and her daughter how to administer the ABX,. If they are comfortable with administering the medication, pt will be discharged to [facility] today- 8/15/18. Pt is in NAD, pt understands the plan of follow up which is ABX through 8/21/18 and to see ID and PMD as well as cardiology and GI within one week. Pt has been given opportunity to ask questions about her plan of care and all questions have been answered. Pt feels well, tolerating full diet. VSS and NAD.   General: NAD, sitting up in chair, well groomed in nightgown  Eyes: PERRLA, EOM intact  Neck: Supple and symmetrical, no JVD  CV: RRR, no m/r/g  Lungs: CTA b/l, no use of accessory muscles, no wheezes, rales, rhonchi  Skin: warm, dry, no rashes  Psych: normal mood/affect  Abdomen: Normal BS, soft, NT/ND  Extremities: no edema, cyanosis  Musculoskeletal: good strength b/l UE/LE, normal ROM, no joint swelling  Neuro: A & O X 3, CN 2-12 grossly intact, no sensory or motor deficit.

## 2018-08-10 NOTE — DISCHARGE NOTE ADULT - CARE PROVIDERS DIRECT ADDRESSES
,DirectAddress_Unknown ,DirectAddress_Unknown,[email or web address] ,DirectAddress_Unknown,[email or web address],DirectAddress_Unknown ,DirectAddress_Unknown,[email or web address],DirectAddress_Unknown,DirectAddress_Unknown

## 2018-08-10 NOTE — BRIEF OPERATIVE NOTE - POST-OP DX
Duodenitis without bleeding  08/10/2018    Lexx Mason  Gastritis and duodenitis  08/10/2018    Lexx Mason  Reflux esophagitis  08/10/2018    Active  Lexx Stratton

## 2018-08-10 NOTE — CONSULT NOTE ADULT - SUBJECTIVE AND OBJECTIVE BOX
Patient seen and examined.  Full consult note to follow.  Essentially, complicated diverticulitis with E coli bacteremia.  Patient appears to be doing well.  On appropriate antibiotics.  Antibiosis per ID.  Patient will need outpatient colonoscopy in 68 weeks. REASON FOR ADMISSION: sepsis    HISTORY OF PRESENT ILLNESS: This is a 78-year-old lady with a past medical history of 2 myocardial infarctions, hyperlipidemia, and hypertension who initially presented to the emergency department on August 7 with complaints of lethargy and shaking chills, was found to have a temperature of 104.9°F, was thought to have a urinary tract infection, received IV hydration and antibiotics and was discharged home.  The following day, the patient continued to feel ill and actually felt worse, and was brought back to the emergency department for further evaluation.  Sepsis workup revealed Escherichia coli bacteremia and it eventually a CT abdomen and pelvis revealed diverticulitis.  Currently, the patient feels quite well apart from experiencing diarrhea, which is making her feel fatigued.  She otherwise has no abdominal pain, nausea, or any other gastrointestinal complaints (apart from the diarrhea).  She has never undergone a colonoscopy and denies a family history of colorectal cancer.    REVIEW OF SYSTEMS:  Constitutional:  No unintentional weight loss  Eyes: No eye pain, redness, discharge, or proptosis  ENMT: No sore throat, ear pain, mouth sores, or swollen glands in the neck  Respiratory: dyspnea on exertion  Gastrointestinal:	Please see HPI  Genitourinary: No dysuria or hematuria  Neurological:	 No changes in sleep/wake cycle, convulsions, confusion, dizziness or lightheadedness  Psychiatric: No changes in personality or emotional problems   Hematology: No easy bruising   Endocrine: No hot or cold flashes or deepening of voice	  All other review of systems were completed and were otherwise negative save what is reported in the HPI.    PAST MEDICAL/SURGICAL HISTORY:  Vertigo  High cholesterol  MI (myocardial infarction): x2  HTN (hypertension)  FH: cholecystectomy    SOCIAL HISTORY:  - TOBACCO: denies  - ALCOHOL: denies  - ILLICIT DRUG USE: Denies    FAMILY HISTORY:  No known history of gastrointestinal or liver disease;  Family history of essential hypertension (Father, Mother)    HOME MEDICATIONS:  atenolol 100 mg oral tablet: 1 tab(s) orally once a day (08 Aug 2018 16:36)  atorvastatin 40 mg oral tablet: 1 tab(s) orally once a day (08 Aug 2018 16:36)  fenofibrate 48 mg oral tablet: 1 tab(s) orally once a day (08 Aug 2018 16:36)  losartan-hydroCHLOROthiazide 100 mg-25 mg oral tablet: 1 tab(s) orally once a day (08 Aug 2018 16:36)    INPATIENT MEDICATIONS:  MEDICATIONS  (STANDING):  ATENolol  Tablet 100 milliGRAM(s) Oral daily  atorvastatin 40 milliGRAM(s) Oral at bedtime  cefTRIAXone   IVPB      cefTRIAXone   IVPB 1 Gram(s) IV Intermittent every 24 hours  dextrose 5% + sodium chloride 0.45% 1000 milliLiter(s) (80 mL/Hr) IV Continuous <Continuous>  enoxaparin Injectable 40 milliGRAM(s) SubCutaneous every 24 hours  fenofibrate Tablet 48 milliGRAM(s) Oral daily  ondansetron Injectable 4 milliGRAM(s) IV Push every 6 hours  pantoprazole    Tablet 40 milliGRAM(s) Oral before breakfast  saccharomyces boulardii 250 milliGRAM(s) Oral two times a day    MEDICATIONS  (PRN):  acetaminophen   Tablet 650 milliGRAM(s) Oral every 6 hours PRN For Temp greater than 38 C (100.4 F)  ALBUTerol/ipratropium for Nebulization 3 milliLiter(s) Nebulizer every 6 hours PRN Shortness of Breath and/or Wheezing    ALLERGIES:  No Known Allergies    VITAL SIGNS LAST 24 HOURS:  T(C): 36.9 (10 Aug 2018 15:57), Max: 37.3 (09 Aug 2018 23:32)  T(F): 98.5 (10 Aug 2018 15:57), Max: 99.2 (09 Aug 2018 23:32)  HR: 73 (10 Aug 2018 15:57) (70 - 85)  BP: 134/76 (10 Aug 2018 15:57) (93/54 - 134/76)  BP(mean): --  RR: 18 (10 Aug 2018 15:57) (18 - 18)  SpO2: 97% (10 Aug 2018 15:57) (97% - 97%)    PHYSICAL EXAM:  Constitutional: Well-developed, well-nourished Female in no apparent distress  Eyes: Sclerae anicteric, conjunctivae normal  ENMT: Mucus membranes moist, no oropharyngeal thrush noted  Neck: No thyroid nodules appreciated, no significant cervical or supraclavicular lymphadenopathy  Respiratory: Breathing nonlabored; clear to auscultation  Cardiovascular: Regular rate and rhythm  Gastrointestinal: Soft, nontender, nondistended, normoactive bowel sounds; no hepatosplenomegaly appreciated; no rebound tenderness or involuntary guarding  Extremities: No clubbing, cyanosis or edema  Neurological: Alert and oriented to person, place and time; no asterixis  Skin: No jaundice  Lymph Nodes: No significant lymphadenopathy  Musculoskeletal: No significant peripheral atrophy  Psychiatric: Affect and mood appropriate      LABS:                        12.5   11.5  )-----------( 379      ( 10 Aug 2018 07:25 )             38.7     PT/INR - ( 09 Aug 2018 06:05 )   PT: 13.1 sec;   INR: 1.19 ratio           08-10    142  |  100  |  18.0  ----------------------------<  142<H>  2.8<LL>   |  26.0  |  0.89    Ca    8.0<L>      10 Aug 2018 07:25  Phos  1.1     08-10  Mg     1.7     08-10            Culture - Blood (collected 08 Aug 2018 07:20)  Source: .Blood Blood-Peripheral  Preliminary Report (10 Aug 2018 09:01):    No growth at 48 hours    Culture - Blood (collected 08 Aug 2018 07:19)  Source: .Blood Blood-Peripheral  Preliminary Report (10 Aug 2018 09:01):    No growth at 48 hours    Culture - Urine (collected 08 Aug 2018 06:50)  Source: .Urine Clean Catch (Midstream)  Final Report (09 Aug 2018 08:33):    No growth    Culture - Blood (collected 07 Aug 2018 19:05)  Source: .Blood Blood  Preliminary Report (09 Aug 2018 08:39):    Growth in anaerobic bottle: Escherichia coli    Anaerobic Bottle: 14:12 Hours to positivity    Aerobic Bottle: No growth to date    .    TYPE: (C=Critical, N=Notification, A=Abnormal) C    TESTS:  _ Positive Blood GS    DATE/TIME CALLED: _ 08/08/2018 09:45    CALLED TO: _ JAKOB: Lyndsey Mustafa RN    READ BACK (2 Patient Identifiers)(Y/N): _ Y    READ BACK VALUES (Y/N): _ Y    CALLED BY: Debbie estrada  Organism: Escherichia coli (09 Aug 2018 08:38)  Organism: Escherichia coli (09 Aug 2018 08:38)    Culture - Blood (collected 07 Aug 2018 19:05)  Source: .Blood Blood  Preliminary Report (09 Aug 2018 08:40):    Growth in anaerobic bottle: Escherichia coli    Anaerobic Bottle: 11:51 Hours to positivity    Aerobic Bottle: No growth to date    .    ***Blood Panel PCR results on this specimen are available    approximately 3 hours after the Gram stain result.***    Gram stain, PCR, and/or culture results may not always    correspond due to difference in methodologies.    ************************************************************    This PCR assay was performed using [employer].    The following targets are tested for: Enterococcus,    vancomycin resistant enterococci, Listeria monocytogenes,    coagulase negative staphylococci, S. aureus,    methicillin resistant S. aureus, Streptococcus agalactiae    (Group B), S. pneumoniae, S. pyogenes (Group A),    Acinetobacter baumannii, Enterobacter cloacae, E. coli,    Klebsiella oxytoca, K. pneumoniae, Proteus sp.,    Serratia marcescens, Haemophilus influenzae,    Neisseria meningitidis, Pseudomonas aeruginosa, Candida    albicans, C. glabrata, C krusei, C parapsilosis,    C. tropicalis and the KPC resistance gene.    "Due to technical problems, Proteus sp. will Not be reported as part of    the BCID panel until further notice"    .    TYPE: (C=Critical, N=Notification, A=Abnormal) C    TESTS:  _ Positive Blood GS    DATE/TIME CALLED: _ 08/08/2018 09:45    CALLED TO: _ WILLOWHR: Lyndsey Mustafa RN    READ BACK (2 Patient Identifiers)(Y/N): _ Y    READ BACK VALUES (Y/N): _ Y    CALLED BY: Debbie estrada  Organism: Escherichia coli  Blood Culture PCR (09 Aug 2018 08:38)  Organism: Escherichia coli (09 Aug 2018 08:38)  Organism: Blood Culture PCR (08 Aug 2018 11:13)      IMAGING:  I personally reviewed the CT abdomen and pelvis, and I agree with the radiologist's interpretation.

## 2018-08-11 DIAGNOSIS — R06.02 SHORTNESS OF BREATH: ICD-10-CM

## 2018-08-11 LAB
ANION GAP SERPL CALC-SCNC: 14 MMOL/L — SIGNIFICANT CHANGE UP (ref 5–17)
BUN SERPL-MCNC: 13 MG/DL — SIGNIFICANT CHANGE UP (ref 8–20)
CALCIUM SERPL-MCNC: 8.6 MG/DL — SIGNIFICANT CHANGE UP (ref 8.6–10.2)
CHLORIDE SERPL-SCNC: 107 MMOL/L — SIGNIFICANT CHANGE UP (ref 98–107)
CO2 SERPL-SCNC: 23 MMOL/L — SIGNIFICANT CHANGE UP (ref 22–29)
CREAT SERPL-MCNC: 0.77 MG/DL — SIGNIFICANT CHANGE UP (ref 0.5–1.3)
GLUCOSE SERPL-MCNC: 215 MG/DL — HIGH (ref 70–115)
HCT VFR BLD CALC: 40.4 % — SIGNIFICANT CHANGE UP (ref 37–47)
HGB BLD-MCNC: 12.9 G/DL — SIGNIFICANT CHANGE UP (ref 12–16)
MAGNESIUM SERPL-MCNC: 1.9 MG/DL — SIGNIFICANT CHANGE UP (ref 1.8–2.6)
MCHC RBC-ENTMCNC: 28.6 PG — SIGNIFICANT CHANGE UP (ref 27–31)
MCHC RBC-ENTMCNC: 31.9 G/DL — LOW (ref 32–36)
MCV RBC AUTO: 89.6 FL — SIGNIFICANT CHANGE UP (ref 81–99)
NT-PROBNP SERPL-SCNC: HIGH PG/ML (ref 0–300)
PHOSPHATE SERPL-MCNC: 1.1 MG/DL — LOW (ref 2.4–4.7)
PLATELET # BLD AUTO: 421 K/UL — HIGH (ref 150–400)
POTASSIUM SERPL-MCNC: 3.9 MMOL/L — SIGNIFICANT CHANGE UP (ref 3.5–5.3)
POTASSIUM SERPL-SCNC: 3.9 MMOL/L — SIGNIFICANT CHANGE UP (ref 3.5–5.3)
RBC # BLD: 4.51 M/UL — SIGNIFICANT CHANGE UP (ref 4.4–5.2)
RBC # FLD: 15 % — SIGNIFICANT CHANGE UP (ref 11–15.6)
SODIUM SERPL-SCNC: 144 MMOL/L — SIGNIFICANT CHANGE UP (ref 135–145)
WBC # BLD: 9.7 K/UL — SIGNIFICANT CHANGE UP (ref 4.8–10.8)
WBC # FLD AUTO: 9.7 K/UL — SIGNIFICANT CHANGE UP (ref 4.8–10.8)

## 2018-08-11 PROCEDURE — 99233 SBSQ HOSP IP/OBS HIGH 50: CPT

## 2018-08-11 PROCEDURE — 99232 SBSQ HOSP IP/OBS MODERATE 35: CPT

## 2018-08-11 PROCEDURE — 71045 X-RAY EXAM CHEST 1 VIEW: CPT | Mod: 26

## 2018-08-11 RX ORDER — FUROSEMIDE 40 MG
40 TABLET ORAL DAILY
Qty: 0 | Refills: 0 | Status: DISCONTINUED | OUTPATIENT
Start: 2018-08-11 | End: 2018-08-13

## 2018-08-11 RX ORDER — SODIUM,POTASSIUM PHOSPHATES 278-250MG
1 POWDER IN PACKET (EA) ORAL
Qty: 0 | Refills: 0 | Status: COMPLETED | OUTPATIENT
Start: 2018-08-11 | End: 2018-08-12

## 2018-08-11 RX ORDER — POTASSIUM PHOSPHATE, MONOBASIC POTASSIUM PHOSPHATE, DIBASIC 236; 224 MG/ML; MG/ML
30 INJECTION, SOLUTION INTRAVENOUS ONCE
Qty: 0 | Refills: 0 | Status: DISCONTINUED | OUTPATIENT
Start: 2018-08-11 | End: 2018-08-11

## 2018-08-11 RX ORDER — ONDANSETRON 8 MG/1
4 TABLET, FILM COATED ORAL EVERY 6 HOURS
Qty: 0 | Refills: 0 | Status: DISCONTINUED | OUTPATIENT
Start: 2018-08-11 | End: 2018-08-15

## 2018-08-11 RX ADMIN — Medication 40 MILLIGRAM(S): at 13:54

## 2018-08-11 RX ADMIN — Medication 48 MILLIGRAM(S): at 12:37

## 2018-08-11 RX ADMIN — Medication 250 MILLIGRAM(S): at 05:00

## 2018-08-11 RX ADMIN — ATENOLOL 100 MILLIGRAM(S): 25 TABLET ORAL at 05:00

## 2018-08-11 RX ADMIN — CEFTRIAXONE 100 GRAM(S): 500 INJECTION, POWDER, FOR SOLUTION INTRAMUSCULAR; INTRAVENOUS at 12:37

## 2018-08-11 RX ADMIN — ATORVASTATIN CALCIUM 40 MILLIGRAM(S): 80 TABLET, FILM COATED ORAL at 22:35

## 2018-08-11 RX ADMIN — PANTOPRAZOLE SODIUM 40 MILLIGRAM(S): 20 TABLET, DELAYED RELEASE ORAL at 05:00

## 2018-08-11 RX ADMIN — Medication 1 TABLET(S): at 22:35

## 2018-08-11 RX ADMIN — Medication 250 MILLIGRAM(S): at 17:04

## 2018-08-11 RX ADMIN — ENOXAPARIN SODIUM 40 MILLIGRAM(S): 100 INJECTION SUBCUTANEOUS at 22:35

## 2018-08-11 RX ADMIN — Medication 1 TABLET(S): at 17:03

## 2018-08-11 NOTE — PROGRESS NOTE ADULT - PROBLEM SELECTOR PLAN 1
improving with antibiotics. Loose stool probably antibiotic associated diarreha. Agree with current therapy. Correct K. Will only see prn your request. F/U with  as outpatient as she will need colonoscopy in 2-3 months following resultion of diverticulitis.

## 2018-08-11 NOTE — PROGRESS NOTE ADULT - PROBLEM SELECTOR PLAN 2
SOB with dry cough  Afebrile  CXR with worsening b/l pulm vasc congestion on 8/9/18, will repeat today, if worse will start diuresis  D/c IVF  ordered BNP, Echo  Pt was on Losartan-HCTZ at home but off it out here, will hold as pt might need lasix SOB with dry cough  98% on RA  Afebrile, doubt pneumonia  CXR with worsening b/l pulm vasc congestion on 8/9/18, will repeat today, if worse will start diuresis  D/c IVF  ordered BNP, Echo  Pt was on Losartan-HCTZ at home but off it out here, will hold as pt might need lasix

## 2018-08-11 NOTE — PROGRESS NOTE ADULT - SUBJECTIVE AND OBJECTIVE BOX
Pt seen and examined f/u sigmoid diverticulitis  This morning there is no abdominal pain, nausea or vomiting. Still with loose stool probably due to antibiotic. WBC down to nromal.    REVIEW OF SYSTEMS:    CONSTITUTIONAL: No fever, weight loss, or fatigue  EYES: No eye pain, visual disturbances, or discharge  ENMT:  No difficulty hearing, tinnitus, vertigo; No sinus or throat pain  RESPIRATORY: No cough, wheezing, chills or hemoptysis; No shortness of breath  CARDIOVASCULAR: No chest pain, palpitations, dizziness, or leg swelling  GASTROINTESTINAL: as above    MEDICATIONS:  MEDICATIONS  (STANDING):  ATENolol  Tablet 100 milliGRAM(s) Oral daily  atorvastatin 40 milliGRAM(s) Oral at bedtime  cefTRIAXone   IVPB      cefTRIAXone   IVPB 1 Gram(s) IV Intermittent every 24 hours  dextrose 5% + sodium chloride 0.45% 1000 milliLiter(s) (80 mL/Hr) IV Continuous <Continuous>  enoxaparin Injectable 40 milliGRAM(s) SubCutaneous every 24 hours  fenofibrate Tablet 48 milliGRAM(s) Oral daily  ondansetron Injectable 4 milliGRAM(s) IV Push every 6 hours  pantoprazole    Tablet 40 milliGRAM(s) Oral before breakfast  saccharomyces boulardii 250 milliGRAM(s) Oral two times a day    MEDICATIONS  (PRN):  acetaminophen   Tablet 650 milliGRAM(s) Oral every 6 hours PRN For Temp greater than 38 C (100.4 F)  ALBUTerol/ipratropium for Nebulization 3 milliLiter(s) Nebulizer every 6 hours PRN Shortness of Breath and/or Wheezing      Allergies    No Known Allergies    Intolerances        Vital Signs Last 24 Hrs  T(C): 37.1 (11 Aug 2018 08:29), Max: 37.1 (11 Aug 2018 08:29)  T(F): 98.8 (11 Aug 2018 08:29), Max: 98.8 (11 Aug 2018 08:29)  HR: 72 (11 Aug 2018 08:29) (70 - 80)  BP: 129/78 (11 Aug 2018 08:29) (112/62 - 134/76)  BP(mean): --  RR: 18 (11 Aug 2018 08:29) (18 - 18)  SpO2: 98% (11 Aug 2018 08:29) (91% - 98%)    08-10 @ 07:01  -  08-11 @ 07:00  --------------------------------------------------------  IN: 1040 mL / OUT: 0 mL / NET: 1040 mL        PHYSICAL EXAM:    General: Well developed; well nourished; in no acute distress  HEENT: MMM, conjunctiva and sclera clear  Gastrointestinal:Abdomen: Soft non-tender non-distended; Normal bowel sounds; No hepatosplenomegaly  Extremities: no cyanosis, clubbing or edema.  Skin: Warm and dry. No obvious rash    LABS:      CBC Full  -  ( 11 Aug 2018 09:39 )  WBC Count : 9.7 K/uL  Hemoglobin : 12.9 g/dL  Hematocrit : 40.4 %  Platelet Count - Automated : 421 K/uL  Mean Cell Volume : 89.6 fl  Mean Cell Hemoglobin : 28.6 pg  Mean Cell Hemoglobin Concentration : 31.9 g/dL  Auto Neutrophil # : x  Auto Lymphocyte # : x  Auto Monocyte # : x  Auto Eosinophil # : x  Auto Basophil # : x  Auto Neutrophil % : x  Auto Lymphocyte % : x  Auto Monocyte % : x  Auto Eosinophil % : x  Auto Basophil % : x    08-10    142  |  100  |  18.0  ----------------------------<  142<H>  2.8<LL>   |  26.0  |  0.89    Ca    8.0<L>      10 Aug 2018 07:25  Phos  1.1     08-10  Mg     1.7     08-10

## 2018-08-11 NOTE — PROGRESS NOTE ADULT - PROBLEM SELECTOR PLAN 1
ID consulted, antibiotics changed, repeat blood cultures negative, needs mid line, not placed yet, needs Ceftriaxone till 8/21/18

## 2018-08-11 NOTE — PROGRESS NOTE ADULT - SUBJECTIVE AND OBJECTIVE BOX
CHIEF COMPLAINT/INTERVAL HISTORY:    Patient is a 78y old  Female who presents with a chief complaint of chills, weakness. (10 Aug 2018 16:41)      HPI:  78 yr old female with hypertension, hyperlipidemia, myocardial infarction admitted with complaints of fever. She was in the ED with fever 104, headaches yesterday, was discharged home after being prescribed antibiotics for UTI. She returned with complaints of chills and feeling weak. Reports feeling well until 2 days ago, when she started having fevers and malaise. Denies sick contacts. She endorses loose stools yesterday, no abdominal pain. Poor appetite.     PMD Eliz (08 Aug 2018 16:32)      SUBJECTIVE & OBJECTIVE/ ROS: Pt seen and examined at bedside. No diarrhea overnight. Last BM was last night. Tolerating CL diet well with no N/V. Does c/o mild SOB with dry cough. No hx of CHF. No chest pain, palpitations, light headedness/dizziness, fevers/chills, abdominal pain, n/v,constipation, dysuria or increased urinary frequency.     ICU Vital Signs Last 24 Hrs  T(C): 37.1 (11 Aug 2018 08:29), Max: 37.1 (11 Aug 2018 08:29)  T(F): 98.8 (11 Aug 2018 08:29), Max: 98.8 (11 Aug 2018 08:29)  HR: 72 (11 Aug 2018 08:29) (70 - 80)  BP: 129/78 (11 Aug 2018 08:29) (112/62 - 134/76)  BP(mean): --  ABP: --  ABP(mean): --  RR: 18 (11 Aug 2018 08:29) (18 - 18)  SpO2: 98% (11 Aug 2018 08:29) (91% - 98%)        MEDICATIONS  (STANDING):  ATENolol  Tablet 100 milliGRAM(s) Oral daily  atorvastatin 40 milliGRAM(s) Oral at bedtime  cefTRIAXone   IVPB      cefTRIAXone   IVPB 1 Gram(s) IV Intermittent every 24 hours  dextrose 5% + sodium chloride 0.45% 1000 milliLiter(s) (80 mL/Hr) IV Continuous <Continuous>  enoxaparin Injectable 40 milliGRAM(s) SubCutaneous every 24 hours  fenofibrate Tablet 48 milliGRAM(s) Oral daily  ondansetron Injectable 4 milliGRAM(s) IV Push every 6 hours  pantoprazole    Tablet 40 milliGRAM(s) Oral before breakfast  saccharomyces boulardii 250 milliGRAM(s) Oral two times a day    MEDICATIONS  (PRN):  acetaminophen   Tablet 650 milliGRAM(s) Oral every 6 hours PRN For Temp greater than 38 C (100.4 F)  ALBUTerol/ipratropium for Nebulization 3 milliLiter(s) Nebulizer every 6 hours PRN Shortness of Breath and/or Wheezing      LABS:                        12.9   9.7   )-----------( 421      ( 11 Aug 2018 09:39 )             40.4     08-11    144  |  107  |  13.0  ----------------------------<  215<H>  3.9   |  23.0  |  0.77    Ca    8.6      11 Aug 2018 09:39  Phos  1.1     08-11  Mg     1.9     08-11            CAPILLARY BLOOD GLUCOSE          RECENT CULTURES:      RADIOLOGY & ADDITIONAL TESTS:      PHYSICAL EXAM:    GENERAL: NAD, well-groomed, well-developed  HEAD:  Atraumatic, Normocephalic  EYES: EOMI, PERRLA, conjunctiva and sclera clear  ENMT: Moist mucous membranes  NECK: Supple, No JVD  NERVOUS SYSTEM:  Alert & Oriented X3, Motor Strength 5/5 B/L upper and lower extremities; DTRs 2+ intact and symmetric  CHEST/LUNG: Clear to auscultation bilaterally; No rales, rhonchi, wheezing, or rubs  HEART: Regular rate and rhythm; No murmurs, rubs, or gallops  ABDOMEN: Soft, Nontender, Nondistended; Bowel sounds present, midline scar  EXTREMITIES:  2+ Peripheral Pulses, No clubbing, cyanosis, or edema

## 2018-08-12 LAB
ANION GAP SERPL CALC-SCNC: 15 MMOL/L — SIGNIFICANT CHANGE UP (ref 5–17)
BASOPHILS # BLD AUTO: 0.1 K/UL — SIGNIFICANT CHANGE UP (ref 0–0.2)
BASOPHILS NFR BLD AUTO: 0.7 % — SIGNIFICANT CHANGE UP (ref 0–2)
BUN SERPL-MCNC: 14 MG/DL — SIGNIFICANT CHANGE UP (ref 8–20)
CALCIUM SERPL-MCNC: 8.7 MG/DL — SIGNIFICANT CHANGE UP (ref 8.6–10.2)
CHLORIDE SERPL-SCNC: 103 MMOL/L — SIGNIFICANT CHANGE UP (ref 98–107)
CO2 SERPL-SCNC: 26 MMOL/L — SIGNIFICANT CHANGE UP (ref 22–29)
CREAT SERPL-MCNC: 0.87 MG/DL — SIGNIFICANT CHANGE UP (ref 0.5–1.3)
EOSINOPHIL # BLD AUTO: 0.4 K/UL — SIGNIFICANT CHANGE UP (ref 0–0.5)
EOSINOPHIL NFR BLD AUTO: 3.6 % — SIGNIFICANT CHANGE UP (ref 0–6)
GLUCOSE SERPL-MCNC: 135 MG/DL — HIGH (ref 70–115)
HCT VFR BLD CALC: 39.3 % — SIGNIFICANT CHANGE UP (ref 37–47)
HGB BLD-MCNC: 12.8 G/DL — SIGNIFICANT CHANGE UP (ref 12–16)
LYMPHOCYTES # BLD AUTO: 1.1 K/UL — SIGNIFICANT CHANGE UP (ref 1–4.8)
LYMPHOCYTES # BLD AUTO: 10.9 % — LOW (ref 20–55)
MAGNESIUM SERPL-MCNC: 1.7 MG/DL — SIGNIFICANT CHANGE UP (ref 1.6–2.6)
MCHC RBC-ENTMCNC: 28.6 PG — SIGNIFICANT CHANGE UP (ref 27–31)
MCHC RBC-ENTMCNC: 32.6 G/DL — SIGNIFICANT CHANGE UP (ref 32–36)
MCV RBC AUTO: 87.7 FL — SIGNIFICANT CHANGE UP (ref 81–99)
MONOCYTES # BLD AUTO: 1 K/UL — HIGH (ref 0–0.8)
MONOCYTES NFR BLD AUTO: 10.1 % — HIGH (ref 3–10)
NEUTROPHILS # BLD AUTO: 7.6 K/UL — SIGNIFICANT CHANGE UP (ref 1.8–8)
NEUTROPHILS NFR BLD AUTO: 73.6 % — HIGH (ref 37–73)
PHOSPHATE SERPL-MCNC: 2.3 MG/DL — LOW (ref 2.4–4.7)
PLATELET # BLD AUTO: 424 K/UL — HIGH (ref 150–400)
POTASSIUM SERPL-MCNC: 3.4 MMOL/L — LOW (ref 3.5–5.3)
POTASSIUM SERPL-SCNC: 3.4 MMOL/L — LOW (ref 3.5–5.3)
RBC # BLD: 4.48 M/UL — SIGNIFICANT CHANGE UP (ref 4.4–5.2)
RBC # FLD: 14.8 % — SIGNIFICANT CHANGE UP (ref 11–15.6)
SODIUM SERPL-SCNC: 144 MMOL/L — SIGNIFICANT CHANGE UP (ref 135–145)
WBC # BLD: 10.3 K/UL — SIGNIFICANT CHANGE UP (ref 4.8–10.8)
WBC # FLD AUTO: 10.3 K/UL — SIGNIFICANT CHANGE UP (ref 4.8–10.8)

## 2018-08-12 PROCEDURE — 71045 X-RAY EXAM CHEST 1 VIEW: CPT | Mod: 26

## 2018-08-12 PROCEDURE — 99233 SBSQ HOSP IP/OBS HIGH 50: CPT

## 2018-08-12 RX ORDER — SODIUM,POTASSIUM PHOSPHATES 278-250MG
1 POWDER IN PACKET (EA) ORAL
Qty: 0 | Refills: 0 | Status: COMPLETED | OUTPATIENT
Start: 2018-08-12 | End: 2018-08-12

## 2018-08-12 RX ADMIN — Medication 1 TABLET(S): at 11:35

## 2018-08-12 RX ADMIN — ATENOLOL 100 MILLIGRAM(S): 25 TABLET ORAL at 06:00

## 2018-08-12 RX ADMIN — Medication 48 MILLIGRAM(S): at 11:35

## 2018-08-12 RX ADMIN — PANTOPRAZOLE SODIUM 40 MILLIGRAM(S): 20 TABLET, DELAYED RELEASE ORAL at 06:00

## 2018-08-12 RX ADMIN — Medication 1 TABLET(S): at 08:32

## 2018-08-12 RX ADMIN — Medication 250 MILLIGRAM(S): at 17:01

## 2018-08-12 RX ADMIN — ENOXAPARIN SODIUM 40 MILLIGRAM(S): 100 INJECTION SUBCUTANEOUS at 20:58

## 2018-08-12 RX ADMIN — CEFTRIAXONE 100 GRAM(S): 500 INJECTION, POWDER, FOR SOLUTION INTRAMUSCULAR; INTRAVENOUS at 13:01

## 2018-08-12 RX ADMIN — Medication 250 MILLIGRAM(S): at 06:00

## 2018-08-12 RX ADMIN — ATORVASTATIN CALCIUM 40 MILLIGRAM(S): 80 TABLET, FILM COATED ORAL at 20:58

## 2018-08-12 RX ADMIN — Medication 1 TABLET(S): at 17:00

## 2018-08-12 NOTE — PROGRESS NOTE ADULT - SUBJECTIVE AND OBJECTIVE BOX
CHIEF COMPLAINT/INTERVAL HISTORY:    Patient is a 78y old  Female who presents with a chief complaint of chills, weakness. (10 Aug 2018 16:41)      HPI:  78 yr old female with hypertension, hyperlipidemia, myocardial infarction admitted with complaints of fever. She was in the ED with fever 104, headaches yesterday, was discharged home after being prescribed antibiotics for UTI. She returned with complaints of chills and feeling weak. Reports feeling well until 2 days ago, when she started having fevers and malaise. Denies sick contacts. She endorses loose stools yesterday, no abdominal pain. Poor appetite.     PMD Eliz (08 Aug 2018 16:32)      SUBJECTIVE & OBJECTIVE/ ROS: Pt seen and examined at bedside. 2L urine output overnight. SOB & cough improved. Stool now semisolid. No chest pain, palpitations,  light headedness/dizziness, fevers/chills, abdominal pain, n/v, constipation, dysuria or increased urinary frequency.      ICU Vital Signs Last 24 Hrs  T(C): 36.9 (12 Aug 2018 08:30), Max: 36.9 (11 Aug 2018 23:17)  T(F): 98.4 (12 Aug 2018 08:30), Max: 98.4 (11 Aug 2018 23:17)  HR: 65 (12 Aug 2018 08:30) (65 - 79)  BP: 128/75 (12 Aug 2018 08:30) (110/60 - 128/75)  BP(mean): --  ABP: --  ABP(mean): --  RR: 18 (12 Aug 2018 08:30) (18 - 18)  SpO2: 98% (12 Aug 2018 08:30) (92% - 98%)        MEDICATIONS  (STANDING):  ATENolol  Tablet 100 milliGRAM(s) Oral daily  atorvastatin 40 milliGRAM(s) Oral at bedtime  cefTRIAXone   IVPB      cefTRIAXone   IVPB 1 Gram(s) IV Intermittent every 24 hours  enoxaparin Injectable 40 milliGRAM(s) SubCutaneous every 24 hours  fenofibrate Tablet 48 milliGRAM(s) Oral daily  furosemide   Injectable 40 milliGRAM(s) IV Push daily  pantoprazole    Tablet 40 milliGRAM(s) Oral before breakfast  potassium acid phosphate/sodium acid phosphate tablet (K-PHOS No. 2) 1 Tablet(s) Oral four times a day with meals  saccharomyces boulardii 250 milliGRAM(s) Oral two times a day    MEDICATIONS  (PRN):  acetaminophen   Tablet 650 milliGRAM(s) Oral every 6 hours PRN For Temp greater than 38 C (100.4 F)  ALBUTerol/ipratropium for Nebulization 3 milliLiter(s) Nebulizer every 6 hours PRN Shortness of Breath and/or Wheezing  ondansetron Injectable 4 milliGRAM(s) IV Push every 6 hours PRN Nausea and/or Vomiting      LABS:                        12.8   10.3  )-----------( 424      ( 12 Aug 2018 09:05 )             39.3     08-12    144  |  103  |  14.0  ----------------------------<  135<H>  3.4<L>   |  26.0  |  0.87    Ca    8.7      12 Aug 2018 09:05  Phos  2.3     08-12  Mg     1.7     08-12            CAPILLARY BLOOD GLUCOSE          RECENT CULTURES:      RADIOLOGY & ADDITIONAL TESTS:      PHYSICAL EXAM:    GENERAL: NAD, well-groomed, well-developed  HEAD:  Atraumatic, Normocephalic  EYES: EOMI, PERRLA, conjunctiva and sclera clear  ENMT: Moist mucous membranes  NECK: Supple, No JVD  NERVOUS SYSTEM:  Alert & Oriented X3, Motor Strength 5/5 B/L upper and lower extremities; DTRs 2+ intact and symmetric  CHEST/LUNG: Clear to auscultation bilaterally; No rales, rhonchi, wheezing, or rubs  HEART: Regular rate and rhythm; No murmurs, rubs, or gallops  ABDOMEN: Soft, Nontender, Nondistended; Bowel sounds present, midline scar  EXTREMITIES:  2+ Peripheral Pulses, No clubbing, cyanosis, or edema

## 2018-08-12 NOTE — CONSULT NOTE ADULT - SUBJECTIVE AND OBJECTIVE BOX
[facility]                                                    [address], [address], [place] [address]                                                         PHONE: [phone]    FAX: [phone] [address], [address], [place] [address]                                                 PHONE: [phone]    FAX: [phone]  *******************************************************************************    Reason for Consult: volume overload    HPI:  ANNIE WALLS is a 78y Female presented with high fever 104.9 per pt and told of a urine infection. Pt had a severe HA in light of the fever and chills. Pt denies CP, palpitations, PND or orthopnea. No dizziness or syncope.  Pt reports she was given a significant amount of IV fluid and had swelling of her fingers, legs and developed SOB. Pt given IV lasix with resolution of symptoms.  Hx of HTN, HL and pt reports being told of an MI in the 90's. Formerly smoked, quitting in 1983.  Pt reports diarrhea since being in the hospital     PAST MEDICAL & SURGICAL HISTORY:  Vertigo  High cholesterol  MI (myocardial infarction): x2  HTN (hypertension)  FH: cholecystectomy    Allergies:  No Known Allergies      MEDICATIONS  (STANDING):  ATENolol  Tablet 100 milliGRAM(s) Oral daily  atorvastatin 40 milliGRAM(s) Oral at bedtime  cefTRIAXone   IVPB      cefTRIAXone   IVPB 1 Gram(s) IV Intermittent every 24 hours  enoxaparin Injectable 40 milliGRAM(s) SubCutaneous every 24 hours  fenofibrate Tablet 48 milliGRAM(s) Oral daily  furosemide   Injectable 40 milliGRAM(s) IV Push daily  pantoprazole    Tablet 40 milliGRAM(s) Oral before breakfast  potassium acid phosphate/sodium acid phosphate tablet (K-PHOS No. 2) 1 Tablet(s) Oral four times a day with meals  saccharomyces boulardii 250 milliGRAM(s) Oral two times a day    MEDICATIONS  (PRN):  acetaminophen   Tablet 650 milliGRAM(s) Oral every 6 hours PRN For Temp greater than 38 C (100.4 F)  ALBUTerol/ipratropium for Nebulization 3 milliLiter(s) Nebulizer every 6 hours PRN Shortness of Breath and/or Wheezing  ondansetron Injectable 4 milliGRAM(s) IV Push every 6 hours PRN Nausea and/or Vomiting      Social History: former tobacco / No EtOH /No  IVDA    Family History: Family history of essential hypertension (Father, Mother). Father passed away of a blood clot. Mother had pancreatic CA. brother had an MI and passed away of malignancy at age 70      ROS: As noted above, otherwise unremarkable.  REVIEW OF SYSTEMS:  CONSTITUTIONAL: No fever, weight loss, or fatigue  EYES: No eye pain, visual disturbances, or discharge  ENMT:  No difficulty hearing, tinnitus, vertigo; No sinus or throat pain  NECK: No pain or stiffness  RESPIRATORY: No cough, wheezing, chills or hemoptysis; No Shortness of Breath  CARDIOVASCULAR: No chest pain, palpitations, passing out, dizziness, or leg swelling  GASTROINTESTINAL: No abdominal or epigastric pain. No nausea, vomiting, or hematemesis; No diarrhea or constipation. No melena or hematochezia.  GENITOURINARY: No dysuria, frequency, hematuria, or incontinence  NEUROLOGICAL: No headaches, memory loss, loss of strength, numbness, or tremors  SKIN: No itching, burning, rashes, or lesions   LYMPH Nodes: No enlarged glands  ENDOCRINE: No heat or cold intolerance; No hair loss  MUSCULOSKELETAL: No joint pain or swelling; No muscle, back, or extremity pain  PSYCHIATRIC: No depression, anxiety, mood swings, or difficulty sleeping  HEME/LYMPH: No easy bruising, or bleeding gums  ALLERGY AND IMMUNOLOGIC: No hives or eczema    Vital Signs Last 24 Hrs  T(C): 36.9 (12 Aug 2018 08:30), Max: 36.9 (11 Aug 2018 23:17)  T(F): 98.4 (12 Aug 2018 08:30), Max: 98.4 (11 Aug 2018 23:17)  HR: 65 (12 Aug 2018 08:30) (65 - 79)  BP: 128/75 (12 Aug 2018 08:30) (110/60 - 152/86)  BP(mean): --  RR: 18 (12 Aug 2018 08:30) (18 - 18)  SpO2: 98% (12 Aug 2018 08:30) (92% - 98%)    I&O's Detail    11 Aug 2018 07:01  -  12 Aug 2018 07:00  --------------------------------------------------------  IN:    Oral Fluid: 680 mL  Total IN: 680 mL    OUT:    Voided: 2050 mL  Total OUT: 2050 mL    Total NET: -1370 mL        I&O's Summary    11 Aug 2018 07:01  -  12 Aug 2018 07:00  --------------------------------------------------------  IN: 680 mL / OUT: 2050 mL / NET: -1370 mL            PHYSICAL EXAM:  General: Appears well developed, well nourished, no acute distress  HEENT: Head: normocephalic, atraumatic  Eyes: Pupils equal and reactive  Neck: Supple, no carotid bruit, no JVD, no HJR  CARDIOVASCULAR: Normal S1 and S2, no murmur, rub, or gallop  LUNGS: Clear to auscultation bilaterally, no rales, rhonchi or wheeze  ABDOMEN: Soft, nontender, non-distended, positive bowel sounds, no mass or bruit  EXTREMITIES: No edema, distal pulses WNL  SKIN: Warm and dry with normal turgor  NEURO: Alert & oriented x 3, grossly intact  PSYCH: normal mood and affect        LABS:                        12.9   9.7   )-----------( 421      ( 11 Aug 2018 09:39 )             40.4     08-12    144  |  103  |  14.0  ----------------------------<  135<H>  3.4<L>   |  26.0  |  0.87    Ca    8.7      12 Aug 2018 09:05  Phos  2.3     08-12  Mg     1.7     08-12              RADIOLOGY & ADDITIONAL STUDIES:    ECG: SR no acute changes    < from: Xray Chest 1 View-PORTABLE IMMEDIATE (08.11.18 @ 11:44) >  IMPRESSION: Bilateral lower lobe infiltrates noted. Trace bilateral   pleural effusions noted.           < end of copied text >    < from: CT Abdomen and Pelvis No Cont (08.08.18 @ 18:59) >  IMPRESSION:   Nephrolithiasis. Parapelvic renal cysts. No hydronephrosis.  No evidence of abdominal pelvic abscess.  1. Mild splenomegaly.   2. Hepatic steatosis.   3..Diverticulitis..   4. Small bilateral pleural effusions/atelectasis. Bibasilar peribronchial   cuffing and interstitial prominence. Findings compatible with pulmonary   edema.    < end of copied text >    Blood cultures: E.coli, now negative    Assessment and Plan:  In summary, ANNIE WALLS is a 78y Female with past medical history significant for  high fever 104.9 per pt and told of a urine infection. Pt had a severe HA in light of the fever and chills. Pt denies CP, palpitations, PND or orthopnea. No dizziness or syncope.  Pt reports she was given a significant amount of IV fluid and had swelling of her fingers, legs and developed SOB. Pt given IV lasix with resolution of symptoms.  Hx of HTN, HL and pt reports being told of an MI in the 90's. Formerly smoked, quitting in 1983.  Pt reports diarrhea since being in the hospital     - E. coli bacteremia/ Fever: Symptoms of bacteremia improved. Resistant to cipro.  Pt maintains ceftriaxone. Source felt to be possible UTI. CT abdomin with diverticulitis.    - Iatrogenic volume overload:  Pt received significant fluid as per sepsis protocol, rapidly responsive to IV diuresis with lasix. SOB and edema has resolved. Echocardiogram to assess LV and valvular function. Would change IV lasix to po in the AM tomorrow. Follow electrolytes on diuretic and with pt c/o diarrhea.    - No symptoms to suggest ACS or hemodynamic compromise. SR.    - HTN. Maintain atenolol    - HL:  atorvastatin, fenofibrate    - Obtain office records in AM    We will follow with you [facility]                                                    [address], [address], [place] [address]                                                         PHONE: [phone]    FAX: [phone] [address], [address], [place] [address]                                                 PHONE: [phone]    FAX: [phone]  *******************************************************************************    Reason for Consult: volume overload    HPI:  ANNIE WALLS is a 78y Female presented with high fever 104.9 per pt and told of a urine infection. Pt had a severe HA in light of the fever and chills. Pt denies CP, palpitations, PND or orthopnea. No dizziness or syncope.  Pt reports she was given a significant amount of IV fluid and had swelling of her fingers, legs and developed SOB. Pt given IV lasix with resolution of symptoms.  Hx of HTN, HL and pt reports being told of an MI in the 90's. Formerly smoked, quitting in 1983.  Pt reports diarrhea since being in the hospital     PAST MEDICAL & SURGICAL HISTORY:  Vertigo  High cholesterol  MI (myocardial infarction): x2  HTN (hypertension)  FH: cholecystectomy    Allergies:  No Known Allergies      MEDICATIONS  (STANDING):  ATENolol  Tablet 100 milliGRAM(s) Oral daily  atorvastatin 40 milliGRAM(s) Oral at bedtime  cefTRIAXone   IVPB      cefTRIAXone   IVPB 1 Gram(s) IV Intermittent every 24 hours  enoxaparin Injectable 40 milliGRAM(s) SubCutaneous every 24 hours  fenofibrate Tablet 48 milliGRAM(s) Oral daily  furosemide   Injectable 40 milliGRAM(s) IV Push daily  pantoprazole    Tablet 40 milliGRAM(s) Oral before breakfast  potassium acid phosphate/sodium acid phosphate tablet (K-PHOS No. 2) 1 Tablet(s) Oral four times a day with meals  saccharomyces boulardii 250 milliGRAM(s) Oral two times a day    MEDICATIONS  (PRN):  acetaminophen   Tablet 650 milliGRAM(s) Oral every 6 hours PRN For Temp greater than 38 C (100.4 F)  ALBUTerol/ipratropium for Nebulization 3 milliLiter(s) Nebulizer every 6 hours PRN Shortness of Breath and/or Wheezing  ondansetron Injectable 4 milliGRAM(s) IV Push every 6 hours PRN Nausea and/or Vomiting      Social History: former tobacco / No EtOH /No  IVDA    Family History: Family history of essential hypertension (Father, Mother). Father passed away of a blood clot. Mother had pancreatic CA. brother had an MI and passed away of malignancy at age 70      ROS: As noted above, otherwise unremarkable.  REVIEW OF SYSTEMS:  CONSTITUTIONAL: No fever, weight loss, or fatigue  EYES: No eye pain, visual disturbances, or discharge  ENMT:  No difficulty hearing, tinnitus, vertigo; No sinus or throat pain  NECK: No pain or stiffness  RESPIRATORY: No cough, wheezing, chills or hemoptysis; No Shortness of Breath  CARDIOVASCULAR: No chest pain, palpitations, passing out, dizziness, or leg swelling  GASTROINTESTINAL: No abdominal or epigastric pain. No nausea, vomiting, or hematemesis; No diarrhea or constipation. No melena or hematochezia.  GENITOURINARY: No dysuria, frequency, hematuria, or incontinence  NEUROLOGICAL: No headaches, memory loss, loss of strength, numbness, or tremors  SKIN: No itching, burning, rashes, or lesions   LYMPH Nodes: No enlarged glands  ENDOCRINE: No heat or cold intolerance; No hair loss  MUSCULOSKELETAL: No joint pain or swelling; No muscle, back, or extremity pain  PSYCHIATRIC: No depression, anxiety, mood swings, or difficulty sleeping  HEME/LYMPH: No easy bruising, or bleeding gums  ALLERGY AND IMMUNOLOGIC: No hives or eczema    Vital Signs Last 24 Hrs  T(C): 36.9 (12 Aug 2018 08:30), Max: 36.9 (11 Aug 2018 23:17)  T(F): 98.4 (12 Aug 2018 08:30), Max: 98.4 (11 Aug 2018 23:17)  HR: 65 (12 Aug 2018 08:30) (65 - 79)  BP: 128/75 (12 Aug 2018 08:30) (110/60 - 152/86)  BP(mean): --  RR: 18 (12 Aug 2018 08:30) (18 - 18)  SpO2: 98% (12 Aug 2018 08:30) (92% - 98%)    I&O's Detail    11 Aug 2018 07:01  -  12 Aug 2018 07:00  --------------------------------------------------------  IN:    Oral Fluid: 680 mL  Total IN: 680 mL    OUT:    Voided: 2050 mL  Total OUT: 2050 mL    Total NET: -1370 mL        I&O's Summary    11 Aug 2018 07:01  -  12 Aug 2018 07:00  --------------------------------------------------------  IN: 680 mL / OUT: 2050 mL / NET: -1370 mL            PHYSICAL EXAM:  General: Appears well developed, well nourished, no acute distress  HEENT: Head: normocephalic, atraumatic  Eyes: Pupils equal and reactive  Neck: Supple, no carotid bruit, no JVD, no HJR  CARDIOVASCULAR: Normal S1 and S2, no murmur, rub, or gallop  LUNGS: Clear to auscultation bilaterally, no rales, rhonchi or wheeze  ABDOMEN: Soft, nontender, non-distended, positive bowel sounds, no mass or bruit  EXTREMITIES: No edema, distal pulses WNL  SKIN: Warm and dry with normal turgor  NEURO: Alert & oriented x 3, grossly intact  PSYCH: normal mood and affect        LABS:                        12.9   9.7   )-----------( 421      ( 11 Aug 2018 09:39 )             40.4     08-12    144  |  103  |  14.0  ----------------------------<  135<H>  3.4<L>   |  26.0  |  0.87    Ca    8.7      12 Aug 2018 09:05  Phos  2.3     08-12  Mg     1.7     08-12              RADIOLOGY & ADDITIONAL STUDIES:    ECG: SR no acute changes    < from: Xray Chest 1 View-PORTABLE IMMEDIATE (08.11.18 @ 11:44) >  IMPRESSION: Bilateral lower lobe infiltrates noted. Trace bilateral   pleural effusions noted.           < end of copied text >    < from: CT Abdomen and Pelvis No Cont (08.08.18 @ 18:59) >  IMPRESSION:   Nephrolithiasis. Parapelvic renal cysts. No hydronephrosis.  No evidence of abdominal pelvic abscess.  1. Mild splenomegaly.   2. Hepatic steatosis.   3..Diverticulitis..   4. Small bilateral pleural effusions/atelectasis. Bibasilar peribronchial   cuffing and interstitial prominence. Findings compatible with pulmonary   edema.    < end of copied text >    Blood cultures: E.coli, now negative    Assessment and Plan:  In summary, ANNIE WALLS is a 78y Female with past medical history significant for  high fever 104.9 per pt and told of a urine infection. Pt had a severe HA in light of the fever and chills. Pt denies CP, palpitations, PND or orthopnea. No dizziness or syncope.  Pt reports she was given a significant amount of IV fluid and had swelling of her fingers, legs and developed SOB. Pt given IV lasix with resolution of symptoms.  Hx of HTN, HL and pt reports being told of an MI in the 90's. Formerly smoked, quitting in 1983.  Pt reports diarrhea since being in the hospital     - E. coli bacteremia/ Fever: Symptoms of bacteremia improved. Resistant to cipro.  Pt maintains ceftriaxone. Source felt to be possible UTI. CT abdomen with diverticulitis.    - Iatrogenic volume overload:  Pt received significant fluid as per sepsis protocol, rapidly responsive to IV diuresis with lasix. SOB and edema has resolved. Echocardiogram to assess LV and valvular function. Would change IV lasix to po in the AM tomorrow. Follow electrolytes on diuretic and with pt c/o diarrhea.    - No symptoms to suggest ACS or hemodynamic compromise. SR.    - HTN. Maintain atenolol    - HL:  atorvastatin, fenofibrate    - Obtain office records in AM    We will follow with you

## 2018-08-12 NOTE — PROGRESS NOTE ADULT - PROBLEM SELECTOR PLAN 2
likely due to iatrogenic fluid overload from sepsis protocol  98% on RA  Afebrile, doubt pneumonia  CXR with worsening b/l pulm vasc congestion on 8/9/18, will repeat today  D/sonia IVF  elevated BNP, f/u Echo  c/w lasix IV today, switch to PO in am  Made 2L output overnight  Pt was on Losartan-HCTZ at home but off it out here  Cardio appreciated

## 2018-08-13 LAB
ANION GAP SERPL CALC-SCNC: 18 MMOL/L — HIGH (ref 5–17)
BASOPHILS # BLD AUTO: 0.1 K/UL — SIGNIFICANT CHANGE UP (ref 0–0.2)
BASOPHILS NFR BLD AUTO: 0.7 % — SIGNIFICANT CHANGE UP (ref 0–2)
BUN SERPL-MCNC: 14 MG/DL — SIGNIFICANT CHANGE UP (ref 8–20)
CALCIUM SERPL-MCNC: 9.3 MG/DL — SIGNIFICANT CHANGE UP (ref 8.6–10.2)
CHLORIDE SERPL-SCNC: 100 MMOL/L — SIGNIFICANT CHANGE UP (ref 98–107)
CO2 SERPL-SCNC: 26 MMOL/L — SIGNIFICANT CHANGE UP (ref 22–29)
CREAT SERPL-MCNC: 0.89 MG/DL — SIGNIFICANT CHANGE UP (ref 0.5–1.3)
CULTURE RESULTS: SIGNIFICANT CHANGE UP
EOSINOPHIL # BLD AUTO: 0.3 K/UL — SIGNIFICANT CHANGE UP (ref 0–0.5)
EOSINOPHIL NFR BLD AUTO: 2.8 % — SIGNIFICANT CHANGE UP (ref 0–6)
GLUCOSE SERPL-MCNC: 141 MG/DL — HIGH (ref 70–115)
HCT VFR BLD CALC: 44.6 % — SIGNIFICANT CHANGE UP (ref 37–47)
HGB BLD-MCNC: 14.6 G/DL — SIGNIFICANT CHANGE UP (ref 12–16)
LYMPHOCYTES # BLD AUTO: 0.9 K/UL — LOW (ref 1–4.8)
LYMPHOCYTES # BLD AUTO: 7.6 % — LOW (ref 20–55)
MAGNESIUM SERPL-MCNC: 1.7 MG/DL — SIGNIFICANT CHANGE UP (ref 1.6–2.6)
MCHC RBC-ENTMCNC: 28.7 PG — SIGNIFICANT CHANGE UP (ref 27–31)
MCHC RBC-ENTMCNC: 32.7 G/DL — SIGNIFICANT CHANGE UP (ref 32–36)
MCV RBC AUTO: 87.8 FL — SIGNIFICANT CHANGE UP (ref 81–99)
MONOCYTES # BLD AUTO: 1.2 K/UL — HIGH (ref 0–0.8)
MONOCYTES NFR BLD AUTO: 9.7 % — SIGNIFICANT CHANGE UP (ref 3–10)
NEUTROPHILS # BLD AUTO: 9.6 K/UL — HIGH (ref 1.8–8)
NEUTROPHILS NFR BLD AUTO: 77.7 % — HIGH (ref 37–73)
ORGANISM # SPEC MICROSCOPIC CNT: SIGNIFICANT CHANGE UP
PHOSPHATE SERPL-MCNC: 3.6 MG/DL — SIGNIFICANT CHANGE UP (ref 2.4–4.7)
PLATELET # BLD AUTO: 527 K/UL — HIGH (ref 150–400)
POTASSIUM SERPL-MCNC: 3.6 MMOL/L — SIGNIFICANT CHANGE UP (ref 3.5–5.3)
POTASSIUM SERPL-SCNC: 3.6 MMOL/L — SIGNIFICANT CHANGE UP (ref 3.5–5.3)
RBC # BLD: 5.08 M/UL — SIGNIFICANT CHANGE UP (ref 4.4–5.2)
RBC # FLD: 14.9 % — SIGNIFICANT CHANGE UP (ref 11–15.6)
SODIUM SERPL-SCNC: 144 MMOL/L — SIGNIFICANT CHANGE UP (ref 135–145)
SPECIMEN SOURCE: SIGNIFICANT CHANGE UP
WBC # BLD: 12.4 K/UL — HIGH (ref 4.8–10.8)
WBC # FLD AUTO: 12.4 K/UL — HIGH (ref 4.8–10.8)

## 2018-08-13 PROCEDURE — 99233 SBSQ HOSP IP/OBS HIGH 50: CPT

## 2018-08-13 PROCEDURE — 93306 TTE W/DOPPLER COMPLETE: CPT | Mod: 26

## 2018-08-13 RX ORDER — PANTOPRAZOLE SODIUM 20 MG/1
1 TABLET, DELAYED RELEASE ORAL
Qty: 30 | Refills: 0 | OUTPATIENT
Start: 2018-08-13 | End: 2018-09-11

## 2018-08-13 RX ORDER — SACCHAROMYCES BOULARDII 250 MG
1 POWDER IN PACKET (EA) ORAL
Qty: 16 | Refills: 0 | OUTPATIENT
Start: 2018-08-13 | End: 2018-08-20

## 2018-08-13 RX ORDER — FUROSEMIDE 40 MG
40 TABLET ORAL DAILY
Qty: 0 | Refills: 0 | Status: DISCONTINUED | OUTPATIENT
Start: 2018-08-13 | End: 2018-08-15

## 2018-08-13 RX ADMIN — ATENOLOL 100 MILLIGRAM(S): 25 TABLET ORAL at 05:10

## 2018-08-13 RX ADMIN — CEFTRIAXONE 100 GRAM(S): 500 INJECTION, POWDER, FOR SOLUTION INTRAMUSCULAR; INTRAVENOUS at 13:14

## 2018-08-13 RX ADMIN — Medication 48 MILLIGRAM(S): at 11:45

## 2018-08-13 RX ADMIN — Medication 40 MILLIGRAM(S): at 05:09

## 2018-08-13 RX ADMIN — ENOXAPARIN SODIUM 40 MILLIGRAM(S): 100 INJECTION SUBCUTANEOUS at 20:34

## 2018-08-13 RX ADMIN — PANTOPRAZOLE SODIUM 40 MILLIGRAM(S): 20 TABLET, DELAYED RELEASE ORAL at 05:08

## 2018-08-13 RX ADMIN — Medication 250 MILLIGRAM(S): at 05:08

## 2018-08-13 RX ADMIN — Medication 250 MILLIGRAM(S): at 17:54

## 2018-08-13 RX ADMIN — ATORVASTATIN CALCIUM 40 MILLIGRAM(S): 80 TABLET, FILM COATED ORAL at 20:34

## 2018-08-13 NOTE — PROGRESS NOTE ADULT - SUBJECTIVE AND OBJECTIVE BOX
CHIEF COMPLAINT/INTERVAL HISTORY:    Patient is a 78y old  Female who presents with a chief complaint of chills, weakness. (10 Aug 2018 16:41)      HPI:  78 yr old female with hypertension, hyperlipidemia, myocardial infarction admitted with complaints of fever. She was in the ED with fever 104, headaches yesterday, was discharged home after being prescribed antibiotics for UTI. She returned with complaints of chills and feeling weak. Reports feeling well until 2 days ago, when she started having fevers and malaise. Denies sick contacts. She endorses loose stools yesterday, no abdominal pain. Poor appetite.     PMD Eliz (08 Aug 2018 16:32)      SUBJECTIVE & OBJECTIVE/ ROS: Pt seen and examined at bedside. SOB much improved. No chest pain, palpitations,  light headedness/dizziness, difficulty breathing fevers/chills, abdominal pain, n/v, diarrhea/constipation, dysuria or increased urinary frequency.     ICU Vital Signs Last 24 Hrs  T(C): 37.3 (14 Aug 2018 08:24), Max: 37.4 (13 Aug 2018 23:27)  T(F): 99.1 (14 Aug 2018 08:24), Max: 99.4 (13 Aug 2018 23:27)  HR: 65 (14 Aug 2018 08:24) (65 - 70)  BP: 124/65 (14 Aug 2018 08:24) (120/68 - 140/80)  BP(mean): --  ABP: --  ABP(mean): --  RR: 18 (14 Aug 2018 08:24) (18 - 18)  SpO2: 95% (14 Aug 2018 08:24) (92% - 95%)        MEDICATIONS  (STANDING):  ATENolol  Tablet 100 milliGRAM(s) Oral daily  atorvastatin 40 milliGRAM(s) Oral at bedtime  cefTRIAXone   IVPB      cefTRIAXone   IVPB 1 Gram(s) IV Intermittent every 24 hours  enoxaparin Injectable 40 milliGRAM(s) SubCutaneous every 24 hours  fenofibrate Tablet 48 milliGRAM(s) Oral daily  furosemide    Tablet 40 milliGRAM(s) Oral daily  pantoprazole    Tablet 40 milliGRAM(s) Oral before breakfast  saccharomyces boulardii 250 milliGRAM(s) Oral two times a day    MEDICATIONS  (PRN):  acetaminophen   Tablet 650 milliGRAM(s) Oral every 6 hours PRN For Temp greater than 38 C (100.4 F)  ALBUTerol/ipratropium for Nebulization 3 milliLiter(s) Nebulizer every 6 hours PRN Shortness of Breath and/or Wheezing  ondansetron Injectable 4 milliGRAM(s) IV Push every 6 hours PRN Nausea and/or Vomiting      LABS:                        14.6   12.4  )-----------( 527      ( 13 Aug 2018 08:55 )             44.6     08-13    144  |  100  |  14.0  ----------------------------<  141<H>  3.6   |  26.0  |  0.89    Ca    9.3      13 Aug 2018 08:55  Phos  3.6     08-13  Mg     1.7     08-13            CAPILLARY BLOOD GLUCOSE          RECENT CULTURES:      RADIOLOGY & ADDITIONAL TESTS:      PHYSICAL EXAM:    GENERAL: NAD, well-groomed, well-developed  HEAD:  Atraumatic, Normocephalic  EYES: EOMI, PERRLA, conjunctiva and sclera clear  ENMT: Moist mucous membranes  NECK: Supple, No JVD  NERVOUS SYSTEM:  Alert & Oriented X3, Motor Strength 5/5 B/L upper and lower extremities; DTRs 2+ intact and symmetric  CHEST/LUNG: Clear to auscultation bilaterally; No rales, rhonchi, wheezing, or rubs  HEART: Regular rate and rhythm; No murmurs, rubs, or gallops  ABDOMEN: Soft, Nontender, Nondistended; Bowel sounds present, midline scar  EXTREMITIES:  2+ Peripheral Pulses, No clubbing, cyanosis, or edema

## 2018-08-13 NOTE — PROGRESS NOTE ADULT - PROBLEM SELECTOR PLAN 2
likely due to iatrogenic fluid overload from sepsis protocol  98% on RA  Afebrile, doubt pneumonia  CXR with worsening b/l pulm vasc congestion on 8/9/18, will repeat today  D/sonia IVF  elevated BNP, Echo noted  s/p lasix IV, no further PO lasix required per cardio  Pt was on Losartan-HCTZ at home, will d/c home on same as discussed with Cardio Dr. Adams

## 2018-08-13 NOTE — PROGRESS NOTE ADULT - SUBJECTIVE AND OBJECTIVE BOX
[facility]                                          [address], [place] [address]                                               PHONE: [phone]    FAX: [phone] [address], [address], [place] [address]                                       PHONE: [phone]    FAX: [phone]  *******************************************************************************    Overnight events/Subjective Assessment:    INTERPRETATION OF TELEMETRY (personally reviewed):    No Known Allergies    MEDICATIONS  (STANDING):  ATENolol  Tablet 100 milliGRAM(s) Oral daily  atorvastatin 40 milliGRAM(s) Oral at bedtime  cefTRIAXone   IVPB      cefTRIAXone   IVPB 1 Gram(s) IV Intermittent every 24 hours  enoxaparin Injectable 40 milliGRAM(s) SubCutaneous every 24 hours  fenofibrate Tablet 48 milliGRAM(s) Oral daily  furosemide   Injectable 40 milliGRAM(s) IV Push daily  pantoprazole    Tablet 40 milliGRAM(s) Oral before breakfast  saccharomyces boulardii 250 milliGRAM(s) Oral two times a day    MEDICATIONS  (PRN):  acetaminophen   Tablet 650 milliGRAM(s) Oral every 6 hours PRN For Temp greater than 38 C (100.4 F)  ALBUTerol/ipratropium for Nebulization 3 milliLiter(s) Nebulizer every 6 hours PRN Shortness of Breath and/or Wheezing  ondansetron Injectable 4 milliGRAM(s) IV Push every 6 hours PRN Nausea and/or Vomiting      Vital Signs Last 24 Hrs  T(C): 37.3 (13 Aug 2018 07:59), Max: 37.3 (13 Aug 2018 07:59)  T(F): 99.1 (13 Aug 2018 07:59), Max: 99.1 (13 Aug 2018 07:59)  HR: 74 (13 Aug 2018 07:59) (74 - 75)  BP: 131/75 (13 Aug 2018 07:59) (120/72 - 131/75)  BP(mean): --  RR: 18 (13 Aug 2018 07:59) (18 - 18)  SpO2: 95% (13 Aug 2018 07:59) (91% - 98%)    I&O's Detail    12 Aug 2018 07:01  -  13 Aug 2018 07:00  --------------------------------------------------------  IN:    Oral Fluid: 500 mL    Solution: 50 mL  Total IN: 550 mL    OUT:    Voided: 1700 mL  Total OUT: 1700 mL    Total NET: -1150 mL        I&O's Summary    12 Aug 2018 07:01  -  13 Aug 2018 07:00  --------------------------------------------------------  IN: 550 mL / OUT: 1700 mL / NET: -1150 mL            PHYSICAL EXAM:  General: Appears well developed, well nourished, no acute distress  HEENT: Head: normocephalic, atraumatic  Eyes: Pupils equal and reactive  Neck: Supple, no carotid bruit, no JVD, no HJR  CARDIOVASCULAR: Normal S1 and S2, no murmur, rub, or gallop  LUNGS: Clear to auscultation bilaterally, no rales, rhonchi or wheeze  ABDOMEN: Soft, nontender, non-distended, positive bowel sounds, no mass or bruit  EXTREMITIES: improving b/l LE pitting edema  SKIN: Warm and dry with normal turgor  NEURO: Alert & oriented x 3, grossly intact  PSYCH: normal mood and affect        LABS:                        12.8   10.3  )-----------( 424      ( 12 Aug 2018 09:05 )             39.3     08-12    144  |  103  |  14.0  ----------------------------<  135<H>  3.4<L>   |  26.0  |  0.87    Ca    8.7      12 Aug 2018 09:05  Phos  2.3     08-12  Mg     1.7     08-12      Serum Pro-Brain Natriuretic Peptide: 09179 pg/mL (08-11 @ 11:40)  serum  Lipids:       RADIOLOGY & ADDITIONAL STUDIES:    < from: Xray Chest 1 View-PORTABLE IMMEDIATE (08.11.18 @ 11:44) >  IMPRESSION: Bilateral lower lobe infiltrates noted. Trace bilateral   pleural effusions noted.           < end of copied text >    < from: CT Abdomen and Pelvis No Cont (08.08.18 @ 18:59) >  IMPRESSION:   Nephrolithiasis. Parapelvic renal cysts. No hydronephrosis.  No evidence of abdominal pelvic abscess.  1. Mild splenomegaly.   2. Hepatic steatosis.   3..Diverticulitis..   4. Small bilateral pleural effusions/atelectasis. Bibasilar peribronchial   cuffing and interstitial prominence. Findings compatible with pulmonary   edema.      ASSESSMENT AND PLAN:  In summary, ANNIE WALLS is a 78y woman with past medical history significant of HTN, HLD, CAD, chronic diastolic HF, admitted with high fever 104.9, found to have sepsis secondary to UTI s/p significant IV fluid per sepsis protocol with subsequent acute on chronic diastolic HF.     - The patient has been chest pain free since admission, without ischemic ECG abnormalities and negative initial troponin, doubt acute MI.   - Acute on chronic diastolic HF most likely secondary to acute fluid overload secondary to aggressive IV hydration per sepsis protocol, now with significant clinical and symptomatic improvement following IV diuresis.  Will change to lasix 40 mg PO daily.    - Supplement potassium to maintain >4.   - Rhythm/hemodynamics stable: continue current regimen.  - Sepsis secondary to UTI: continue management per medicine.       Fabian Adams M.D. [facility]                                          [address] , [address], [place] [address]                                               PHONE: [phone]    FAX: [phone] [address], [address], [place] [address]                                       PHONE: [phone]    FAX: [phone]  *******************************************************************************    Overnight events/Subjective Assessment: Feeling much better; frustrated that she remains in the hospital      No Known Allergies    MEDICATIONS  (STANDING):  ATENolol  Tablet 100 milliGRAM(s) Oral daily  atorvastatin 40 milliGRAM(s) Oral at bedtime  cefTRIAXone   IVPB      cefTRIAXone   IVPB 1 Gram(s) IV Intermittent every 24 hours  enoxaparin Injectable 40 milliGRAM(s) SubCutaneous every 24 hours  fenofibrate Tablet 48 milliGRAM(s) Oral daily  furosemide   Injectable 40 milliGRAM(s) IV Push daily  pantoprazole    Tablet 40 milliGRAM(s) Oral before breakfast  saccharomyces boulardii 250 milliGRAM(s) Oral two times a day    MEDICATIONS  (PRN):  acetaminophen   Tablet 650 milliGRAM(s) Oral every 6 hours PRN For Temp greater than 38 C (100.4 F)  ALBUTerol/ipratropium for Nebulization 3 milliLiter(s) Nebulizer every 6 hours PRN Shortness of Breath and/or Wheezing  ondansetron Injectable 4 milliGRAM(s) IV Push every 6 hours PRN Nausea and/or Vomiting      Vital Signs Last 24 Hrs  T(C): 37.3 (13 Aug 2018 07:59), Max: 37.3 (13 Aug 2018 07:59)  T(F): 99.1 (13 Aug 2018 07:59), Max: 99.1 (13 Aug 2018 07:59)  HR: 74 (13 Aug 2018 07:59) (74 - 75)  BP: 131/75 (13 Aug 2018 07:59) (120/72 - 131/75)  BP(mean): --  RR: 18 (13 Aug 2018 07:59) (18 - 18)  SpO2: 95% (13 Aug 2018 07:59) (91% - 98%)    I&O's Detail    12 Aug 2018 07:01  -  13 Aug 2018 07:00  --------------------------------------------------------  IN:    Oral Fluid: 500 mL    Solution: 50 mL  Total IN: 550 mL    OUT:    Voided: 1700 mL  Total OUT: 1700 mL    Total NET: -1150 mL    I&O's Summary    12 Aug 2018 07:01  -  13 Aug 2018 07:00  --------------------------------------------------------  IN: 550 mL / OUT: 1700 mL / NET: -1150 mL      PHYSICAL EXAM:  General: Appears well developed, well nourished, no acute distress  HEENT: Head: normocephalic, atraumatic  Eyes: Pupils equal and reactive  Neck: Supple, no carotid bruit, no JVD, no HJR  CARDIOVASCULAR: Normal S1 and S2, no murmur, rub, or gallop  LUNGS: Clear to auscultation bilaterally, no rales, rhonchi or wheeze  ABDOMEN: Soft, nontender, non-distended, positive bowel sounds, no mass or bruit  EXTREMITIES: No edema  SKIN: Warm and dry with normal turgor  NEURO: Alert & oriented x 3, grossly intact  PSYCH: normal mood and affect        LABS:                        12.8   10.3  )-----------( 424      ( 12 Aug 2018 09:05 )             39.3     08-12    144  |  103  |  14.0  ----------------------------<  135<H>  3.4<L>   |  26.0  |  0.87    Ca    8.7      12 Aug 2018 09:05  Phos  2.3     08-12  Mg     1.7     08-12      Serum Pro-Brain Natriuretic Peptide: 71280 pg/mL (08-11 @ 11:40)  serum  Lipids:       RADIOLOGY & ADDITIONAL STUDIES:    < from: Xray Chest 1 View-PORTABLE IMMEDIATE (08.11.18 @ 11:44) >  IMPRESSION: Bilateral lower lobe infiltrates noted. Trace bilateral   pleural effusions noted.           < end of copied text >    < from: CT Abdomen and Pelvis No Cont (08.08.18 @ 18:59) >  IMPRESSION:   Nephrolithiasis. Parapelvic renal cysts. No hydronephrosis.  No evidence of abdominal pelvic abscess.  1. Mild splenomegaly.   2. Hepatic steatosis.   3..Diverticulitis..   4. Small bilateral pleural effusions/atelectasis. Bibasilar peribronchial   cuffing and interstitial prominence. Findings compatible with pulmonary   edema.      ASSESSMENT AND PLAN:  In summary, ANNIE WALLS is a 78y woman with past medical history significant of HTN, HLD, CAD, chronic diastolic HF, admitted with high fever 104.9, found to have sepsis secondary to UTI s/p significant IV fluid per sepsis protocol with subsequent acute on chronic diastolic HF.     - The patient has been chest pain free since admission, without ischemic ECG abnormalities and negative initial troponin, doubt acute MI.   - Acute on chronic diastolic HF most likely secondary to acute fluid overload secondary to aggressive IV hydration per sepsis protocol, now with significant clinical and symptomatic improvement following IV diuresis.  Received lasix 40 mg IV this morning.  Will discontinue lasix at this time.    - Supplement potassium to maintain >4.   - Rhythm/hemodynamics stable: continue current regimen.  - Sepsis secondary to UTI: continue management per medicine.   - No further inpatient cardiovascular work up indicated.    - Will follow up as needed; please do not hesitate to call with any questions or concerns.     Fabian Adams M.D.

## 2018-08-14 LAB
ANION GAP SERPL CALC-SCNC: 15 MMOL/L — SIGNIFICANT CHANGE UP (ref 5–17)
BASOPHILS # BLD AUTO: 0 K/UL — SIGNIFICANT CHANGE UP (ref 0–0.2)
BASOPHILS NFR BLD AUTO: 0.4 % — SIGNIFICANT CHANGE UP (ref 0–2)
BUN SERPL-MCNC: 18 MG/DL — SIGNIFICANT CHANGE UP (ref 8–20)
CALCIUM SERPL-MCNC: 8.9 MG/DL — SIGNIFICANT CHANGE UP (ref 8.6–10.2)
CHLORIDE SERPL-SCNC: 102 MMOL/L — SIGNIFICANT CHANGE UP (ref 98–107)
CO2 SERPL-SCNC: 27 MMOL/L — SIGNIFICANT CHANGE UP (ref 22–29)
CREAT SERPL-MCNC: 0.9 MG/DL — SIGNIFICANT CHANGE UP (ref 0.5–1.3)
EOSINOPHIL # BLD AUTO: 0.3 K/UL — SIGNIFICANT CHANGE UP (ref 0–0.5)
EOSINOPHIL NFR BLD AUTO: 2.7 % — SIGNIFICANT CHANGE UP (ref 0–6)
GLUCOSE SERPL-MCNC: 155 MG/DL — HIGH (ref 70–115)
HCT VFR BLD CALC: 45 % — SIGNIFICANT CHANGE UP (ref 37–47)
HGB BLD-MCNC: 14.3 G/DL — SIGNIFICANT CHANGE UP (ref 12–16)
LACTATE SERPL-SCNC: 2 MMOL/L — SIGNIFICANT CHANGE UP (ref 0.5–2)
LYMPHOCYTES # BLD AUTO: 0.9 K/UL — LOW (ref 1–4.8)
LYMPHOCYTES # BLD AUTO: 8.2 % — LOW (ref 20–55)
MAGNESIUM SERPL-MCNC: 1.7 MG/DL — SIGNIFICANT CHANGE UP (ref 1.6–2.6)
MCHC RBC-ENTMCNC: 28 PG — SIGNIFICANT CHANGE UP (ref 27–31)
MCHC RBC-ENTMCNC: 31.8 G/DL — LOW (ref 32–36)
MCV RBC AUTO: 88.1 FL — SIGNIFICANT CHANGE UP (ref 81–99)
MONOCYTES # BLD AUTO: 1.3 K/UL — HIGH (ref 0–0.8)
MONOCYTES NFR BLD AUTO: 11.6 % — HIGH (ref 3–10)
NEUTROPHILS # BLD AUTO: 8.5 K/UL — HIGH (ref 1.8–8)
NEUTROPHILS NFR BLD AUTO: 75.4 % — HIGH (ref 37–73)
PHOSPHATE SERPL-MCNC: 3.3 MG/DL — SIGNIFICANT CHANGE UP (ref 2.4–4.7)
PLATELET # BLD AUTO: 466 K/UL — HIGH (ref 150–400)
POTASSIUM SERPL-MCNC: 3.5 MMOL/L — SIGNIFICANT CHANGE UP (ref 3.5–5.3)
POTASSIUM SERPL-SCNC: 3.5 MMOL/L — SIGNIFICANT CHANGE UP (ref 3.5–5.3)
RBC # BLD: 5.11 M/UL — SIGNIFICANT CHANGE UP (ref 4.4–5.2)
RBC # FLD: 14.7 % — SIGNIFICANT CHANGE UP (ref 11–15.6)
SODIUM SERPL-SCNC: 144 MMOL/L — SIGNIFICANT CHANGE UP (ref 135–145)
WBC # BLD: 11.3 K/UL — HIGH (ref 4.8–10.8)
WBC # FLD AUTO: 11.3 K/UL — HIGH (ref 4.8–10.8)

## 2018-08-14 PROCEDURE — 36569 INSJ PICC 5 YR+ W/O IMAGING: CPT

## 2018-08-14 PROCEDURE — 99233 SBSQ HOSP IP/OBS HIGH 50: CPT

## 2018-08-14 PROCEDURE — 76937 US GUIDE VASCULAR ACCESS: CPT | Mod: 26

## 2018-08-14 RX ADMIN — Medication 40 MILLIGRAM(S): at 05:05

## 2018-08-14 RX ADMIN — Medication 250 MILLIGRAM(S): at 05:05

## 2018-08-14 RX ADMIN — Medication 48 MILLIGRAM(S): at 11:27

## 2018-08-14 RX ADMIN — ATENOLOL 100 MILLIGRAM(S): 25 TABLET ORAL at 05:05

## 2018-08-14 RX ADMIN — PANTOPRAZOLE SODIUM 40 MILLIGRAM(S): 20 TABLET, DELAYED RELEASE ORAL at 05:05

## 2018-08-14 RX ADMIN — CEFTRIAXONE 100 GRAM(S): 500 INJECTION, POWDER, FOR SOLUTION INTRAMUSCULAR; INTRAVENOUS at 14:37

## 2018-08-14 RX ADMIN — Medication 250 MILLIGRAM(S): at 18:35

## 2018-08-14 NOTE — PROGRESS NOTE ADULT - PROBLEM SELECTOR PLAN 2
Currently resolved  Likely due to iatrogenic fluid overload from sepsis protocol  CXR with worsening b/l pulm vasc congestion on 8/9/18, Pt was diuressed with IV lasix and fluids were discontinued. Repeat cxr on 8/12 with complete resolution of vasc congestion.   Cardio consult appreciated, echo ordered to evaluate LV and valvular function.. echo findings with decreased LVF with preserved EF. To discuss with Dr. Adams whether new or old findings.   Pt was on Losartan-HCTZ at home, will d/c home on same as discussed with Cardio Dr. Adams Currently resolved  Likely due to iatrogenic fluid overload from sepsis protocol  CXR with worsening b/l pulm vasc congestion on 8/9/18, Pt was diuressed with IV lasix and fluids were discontinued. Repeat cxr on 8/12 with complete resolution of vasc congestion.   Cardio consult appreciated, echo ordered to evaluate LV and valvular function.. echo findings with decreased LVF with preserved EF. Dr. Adams made aware, pt to follow up with him as outpatient for further workup/eval. No further work up at this time as per cardio  Pt was on Losartan-HCTZ at home, will d/c home on same as discussed with Cardio Dr. Adams Currently resolved  Likely due to iatrogenic fluid overload from sepsis protocol  CXR with worsening b/l pulm vasc congestion on 8/9/18, Pt was diuressed with IV lasix and fluids were discontinued. Repeat cxr on 8/12 with complete resolution of vasc congestion.   Cardio consult appreciated, echo ordered to evaluate LV and valvular function.. echo findings with decreased LVF with preserved EF. Dr. Adams made aware, pt to follow up with him as outpatient for further workup/eval. No further work up at this time as per cardio  Pt was on Losartan-HCTZ at home, will d/c home on same as discussed with Cardio Dr. Adams, can continue PO lasix while inhouse Currently resolved  Likely due to iatrogenic fluid overload from sepsis protocol  CXR with worsening b/l pulm vasc congestion on 8/9/18, Pt was diuressed with IV lasix and fluids were discontinued. Repeat cxr on 8/12 with complete resolution of vasc congestion.   Cardio consult appreciated, echo ordered to evaluate LV and valvular function.. echo findings with decreased LVF with preserved EF. Dr. Adams made aware, pt to follow up with him as outpatient for further workup/eval. No further work up at this time as per cardio  Pt was on Losartan-HCTZ at home, will d/c home on same as discussed with Cardio Dr. Adams, can continue PO lasix while inhouse  Echo with wall motion, cardio Dr. Adams made aware, he will follow up as outpatient, cleared for d/c

## 2018-08-14 NOTE — PROGRESS NOTE ADULT - PROBLEM SELECTOR PLAN 1
repeat blood cultures negative, ID consulted, antibiotics changed, needs midline (to be placed today) not placed yet, needs Ceftriaxone till 8/21/18 and follow up with Dr. Mccartney in 2 weeks post discharge. repeat blood cultures negative, ID consulted, antibiotics changed, midline placed today, needs Ceftriaxone till 8/21/18 and follow up with Dr. Mccartney in 2 weeks post discharge.

## 2018-08-14 NOTE — PROGRESS NOTE ADULT - SUBJECTIVE AND OBJECTIVE BOX
CHIEF COMPLAINT/INTERVAL HISTORY:    Patient is a 78y old  Female who presents with a chief complaint of chills, weakness. (10 Aug 2018 16:41)    HPI: 78 yr old female with hypertension, hyperlipidemia, myocardial infarction admitted with complaints of fever. She was in the ED with fever 104, headaches yesterday, was discharged home after being prescribed antibiotics for UTI. She returned with complaints of chills and feeling weak. Reports feeling well until 2 days ago, when she started having fevers and malaise. Denies sick contacts. She endorses loose stools yesterday, no abdominal pain. Poor appetite. PMD Eliz    SUBJECTIVE & OBJECTIVE: No events reported overnight. pt seen and examined at bedside. Pt upset because she is waiting for midline and still not completed. Pt otherwise denies all medical complaints. ROS negative. VSS    Vital Signs Last 24 Hrs  T(C): 37.3 (14 Aug 2018 08:24), Max: 37.4 (13 Aug 2018 23:27)  T(F): 99.1 (14 Aug 2018 08:24), Max: 99.4 (13 Aug 2018 23:27)  HR: 65 (14 Aug 2018 08:24) (65 - 70)  BP: 124/65 (14 Aug 2018 08:24) (120/68 - 140/80)  BP(mean): --  RR: 18 (14 Aug 2018 08:24) (18 - 18)  SpO2: 95% (14 Aug 2018 08:24) (92% - 95%)    PHYSICAL EXAM:  GENERAL: NAD, well-groomed, well-developed  HEAD:  Atraumatic, Normocephalic  EYES: EOMI, PERRLA, conjunctiva and sclera clear  NERVOUS SYSTEM:  Alert & Oriented X3  CHEST/LUNG: Clear to auscultation bilaterally; No rales, rhonchi, wheezing, or rubs  HEART: Regular rate and rhythm; No murmurs, rubs, or gallops  ABDOMEN: Soft, Nontender, Nondistended; Bowel sounds present, midline scar  EXTREMITIES:  2+ Peripheral Pulses, No clubbing, cyanosis, or edema    MEDICATIONS  (STANDING):  ATENolol  Tablet 100 milliGRAM(s) Oral daily  atorvastatin 40 milliGRAM(s) Oral at bedtime  cefTRIAXone   IVPB      cefTRIAXone   IVPB 1 Gram(s) IV Intermittent every 24 hours  enoxaparin Injectable 40 milliGRAM(s) SubCutaneous every 24 hours  fenofibrate Tablet 48 milliGRAM(s) Oral daily  furosemide    Tablet 40 milliGRAM(s) Oral daily  pantoprazole    Tablet 40 milliGRAM(s) Oral before breakfast  saccharomyces boulardii 250 milliGRAM(s) Oral two times a day    MEDICATIONS  (PRN):  acetaminophen   Tablet 650 milliGRAM(s) Oral every 6 hours PRN For Temp greater than 38 C (100.4 F)  ALBUTerol/ipratropium for Nebulization 3 milliLiter(s) Nebulizer every 6 hours PRN Shortness of Breath and/or Wheezing  ondansetron Injectable 4 milliGRAM(s) IV Push every 6 hours PRN Nausea and/or Vomiting      LABS:                        14.6   12.4  )-----------( 527      ( 13 Aug 2018 08:55 )             44.6     08-13    144  |  100  |  14.0  ----------------------------<  141<H>  3.6   |  26.0  |  0.89    Ca    9.3      13 Aug 2018 08:55  Phos  3.6     08-13  Mg     1.7     08-13            RECENT CULTURES:  RADIOLOGY & ADDITIONAL TESTS:

## 2018-08-14 NOTE — DIETITIAN INITIAL EVALUATION ADULT. - PERTINENT LABORATORY DATA
08-14 Na144 mmol/L Glu 155 mg/dL<H> K+ 3.5 mmol/L Cr  0.90 mg/dL BUN 18.0 mg/dL Phos 3.3 mg/dL Alb n/a   PAB n/a

## 2018-08-14 NOTE — PROGRESS NOTE ADULT - ASSESSMENT
78 yr old female with hypertension, hyperlipidemia, myocardial infarction admitted with complaints of fever. She was recently discharged from ED for UTI, returned with worsening symptoms, fever and chills. Noted to have gram negative bacteremia, positive UA. She had a CT chest suggestive of bronchitis. CT abdomen was ordered, showed diverticulitis. ID was consulted, her blood cultures grew E coli resistant to Ciprofloxacin. She was started on Ceftriaxone. Repeat blood cultures negative, mid line requested for IV antibiotics at home until 8/21 as per ID. Pt was also consulted by cardio after pt developed worsening SOB secondary to fluid overload due to fluid procedure for sepsis protocol. After IV diuresis, SOB and edema resolved. Echo was ordered to evaluate LV and valvular function.. echo with decreased LVF with preserved EF. Awaiting to discuss with Dr. Adams whether new or old findings. 78 yr old female with hypertension, hyperlipidemia, myocardial infarction admitted with complaints of fever. She was recently discharged from ED for UTI, returned with worsening symptoms, fever and chills. Noted to have gram negative bacteremia, positive UA. She had a CT chest suggestive of bronchitis. CT abdomen was ordered, showed diverticulitis. ID was consulted, her blood cultures grew E coli resistant to Ciprofloxacin. She was started on Ceftriaxone. Repeat blood cultures negative, mid line requested for IV antibiotics at home until 8/21 as per ID. Pt was also consulted by cardio after pt developed worsening SOB secondary to fluid overload due to fluid procedure for sepsis protocol. After IV diuresis, SOB and edema resolved. Echo was ordered to evaluate LV and valvular function.. echo with decreased LVF with preserved EF. Awaiting to discuss with Dr. Adams whether new or old findings.     Dispo plan: pt received midline today. Home infusion company to evaluate pts home needs and then eventual DC. [facility] aware. 78 yr old female with hypertension, hyperlipidemia, myocardial infarction admitted with complaints of fever. She was recently discharged from ED for UTI, returned with worsening symptoms, fever and chills. Noted to have gram negative bacteremia, positive UA. She had a CT chest suggestive of bronchitis. CT abdomen was ordered, showed diverticulitis. ID was consulted, her blood cultures grew E coli resistant to Ciprofloxacin. She was started on Ceftriaxone. Repeat blood cultures negative, mid line requested for IV antibiotics at home until 8/21 as per ID. Pt was also consulted by cardio after pt developed worsening SOB secondary to fluid overload due to fluid procedure for sepsis protocol. After IV diuresis, SOB and edema resolved. Echo was ordered to evaluate LV and valvular function.. echo with decreased LVF with preserved EF. Dr. Adams made aware, will have pt follow up o/p for further work up.     Dispo plan: pt received midline today. [facility] [employer] to evaluate pt/teach pt how to administer IV meds tomorrow and then hopefully DC. [facility] aware

## 2018-08-14 NOTE — PROCEDURE NOTE - NSPOSTCAREGUIDE_GEN_A_CORE
Instructed patient/caregiver to follow-up with primary care physician/Instructed patient/caregiver regarding signs and symptoms of infection/Care for catheter as per unit/ICU protocols/Keep the cast/splint/dressing clean and dry/Verbal/written post procedure instructions were given to patient/caregiver

## 2018-08-14 NOTE — PROCEDURE NOTE - PROCEDURE
<<-----Click on this checkbox to enter Procedure Vascular access with ultrasound guidance  08/14/2018  Patent left brachial vein  Active  JSTEELE  Peripheral insertion of midline catheter  08/14/2018  4FR  12CM  24circ BARD POWER MIDLINE ns flush good heme back left brachial vein  Active  JSTEELE

## 2018-08-14 NOTE — PROCEDURE NOTE - NSPROCDETAILS_GEN_ALL_CORE
supine position/ultrasound assessment/location identified, draped/prepped, sterile technique used/sterile technique, catheter placed/sterile dressing applied/ultrasound guidance

## 2018-08-15 VITALS
OXYGEN SATURATION: 97 % | RESPIRATION RATE: 18 BRPM | HEART RATE: 73 BPM | DIASTOLIC BLOOD PRESSURE: 57 MMHG | SYSTOLIC BLOOD PRESSURE: 122 MMHG | TEMPERATURE: 98 F

## 2018-08-15 LAB
ANION GAP SERPL CALC-SCNC: 17 MMOL/L — SIGNIFICANT CHANGE UP (ref 5–17)
BASOPHILS # BLD AUTO: 0.1 K/UL — SIGNIFICANT CHANGE UP (ref 0–0.2)
BASOPHILS NFR BLD AUTO: 2 % — SIGNIFICANT CHANGE UP (ref 0–2)
BUN SERPL-MCNC: 20 MG/DL — SIGNIFICANT CHANGE UP (ref 8–20)
CALCIUM SERPL-MCNC: 9.6 MG/DL — SIGNIFICANT CHANGE UP (ref 8.6–10.2)
CHLORIDE SERPL-SCNC: 101 MMOL/L — SIGNIFICANT CHANGE UP (ref 98–107)
CO2 SERPL-SCNC: 25 MMOL/L — SIGNIFICANT CHANGE UP (ref 22–29)
CREAT SERPL-MCNC: 0.87 MG/DL — SIGNIFICANT CHANGE UP (ref 0.5–1.3)
CULTURE RESULTS: SIGNIFICANT CHANGE UP
CULTURE RESULTS: SIGNIFICANT CHANGE UP
EOSINOPHIL # BLD AUTO: 0.4 K/UL — SIGNIFICANT CHANGE UP (ref 0–0.5)
EOSINOPHIL NFR BLD AUTO: 5 % — SIGNIFICANT CHANGE UP (ref 0–5)
GLUCOSE SERPL-MCNC: 158 MG/DL — HIGH (ref 70–115)
HCT VFR BLD CALC: 45.6 % — SIGNIFICANT CHANGE UP (ref 37–47)
HGB BLD-MCNC: 14.5 G/DL — SIGNIFICANT CHANGE UP (ref 12–16)
LYMPHOCYTES # BLD AUTO: 1.1 K/UL — SIGNIFICANT CHANGE UP (ref 1–4.8)
LYMPHOCYTES # BLD AUTO: 5 % — LOW (ref 20–55)
MAGNESIUM SERPL-MCNC: 1.8 MG/DL — SIGNIFICANT CHANGE UP (ref 1.6–2.6)
MCHC RBC-ENTMCNC: 27.9 PG — SIGNIFICANT CHANGE UP (ref 27–31)
MCHC RBC-ENTMCNC: 31.8 G/DL — LOW (ref 32–36)
MCV RBC AUTO: 87.7 FL — SIGNIFICANT CHANGE UP (ref 81–99)
MONOCYTES # BLD AUTO: 1.7 K/UL — HIGH (ref 0–0.8)
MONOCYTES NFR BLD AUTO: 8 % — SIGNIFICANT CHANGE UP (ref 3–10)
MYELOCYTES NFR BLD: 3 % — HIGH (ref 0–0)
NEUTROPHILS # BLD AUTO: 10.1 K/UL — HIGH (ref 1.8–8)
NEUTROPHILS NFR BLD AUTO: 71 % — SIGNIFICANT CHANGE UP (ref 37–73)
NEUTS BAND # BLD: 6 % — SIGNIFICANT CHANGE UP (ref 0–8)
PHOSPHATE SERPL-MCNC: 3.1 MG/DL — SIGNIFICANT CHANGE UP (ref 2.4–4.7)
PLAT MORPH BLD: NORMAL — SIGNIFICANT CHANGE UP
PLATELET # BLD AUTO: 497 K/UL — HIGH (ref 150–400)
POTASSIUM SERPL-MCNC: 3.7 MMOL/L — SIGNIFICANT CHANGE UP (ref 3.5–5.3)
POTASSIUM SERPL-SCNC: 3.7 MMOL/L — SIGNIFICANT CHANGE UP (ref 3.5–5.3)
RBC # BLD: 5.2 M/UL — SIGNIFICANT CHANGE UP (ref 4.4–5.2)
RBC # FLD: 14.6 % — SIGNIFICANT CHANGE UP (ref 11–15.6)
RBC BLD AUTO: SIGNIFICANT CHANGE UP
SODIUM SERPL-SCNC: 143 MMOL/L — SIGNIFICANT CHANGE UP (ref 135–145)
SPECIMEN SOURCE: SIGNIFICANT CHANGE UP
SPECIMEN SOURCE: SIGNIFICANT CHANGE UP
WBC # BLD: 12.8 K/UL — HIGH (ref 4.8–10.8)
WBC # FLD AUTO: 12.8 K/UL — HIGH (ref 4.8–10.8)

## 2018-08-15 PROCEDURE — 99239 HOSP IP/OBS DSCHRG MGMT >30: CPT

## 2018-08-15 RX ADMIN — CEFTRIAXONE 100 GRAM(S): 500 INJECTION, POWDER, FOR SOLUTION INTRAMUSCULAR; INTRAVENOUS at 13:02

## 2018-08-15 RX ADMIN — PANTOPRAZOLE SODIUM 40 MILLIGRAM(S): 20 TABLET, DELAYED RELEASE ORAL at 05:18

## 2018-08-15 RX ADMIN — ATENOLOL 100 MILLIGRAM(S): 25 TABLET ORAL at 05:19

## 2018-08-15 RX ADMIN — ENOXAPARIN SODIUM 40 MILLIGRAM(S): 100 INJECTION SUBCUTANEOUS at 05:18

## 2018-08-15 RX ADMIN — ATORVASTATIN CALCIUM 40 MILLIGRAM(S): 80 TABLET, FILM COATED ORAL at 05:19

## 2018-08-15 RX ADMIN — Medication 40 MILLIGRAM(S): at 05:18

## 2018-08-15 RX ADMIN — Medication 48 MILLIGRAM(S): at 12:46

## 2018-08-15 RX ADMIN — Medication 250 MILLIGRAM(S): at 05:18

## 2018-08-15 NOTE — PROGRESS NOTE ADULT - PROBLEM SELECTOR PROBLEM 4
High cholesterol
Bronchitis
Bronchitis
High cholesterol

## 2018-08-15 NOTE — PROGRESS NOTE ADULT - PROBLEM SELECTOR PLAN 2
Currently resolved  Likely due to iatrogenic fluid overload from sepsis protocol  CXR with worsening b/l pulm vasc congestion on 8/9/18, Pt was diuresed with IV lasix and fluids were discontinued. Repeat cxr on 8/12 with complete resolution of vasc congestion.   Cardio consult appreciated, echo ordered to evaluate LV and valvular function.. echo findings with decreased LVF with preserved EF. Dr. Adams made aware, pt to follow up with him as outpatient for further workup/eval. No further work up at this time as per cardio  Pt was on Losartan-HCTZ at home, will d/c home on same as discussed with Cardio Dr. Adams, can continue PO lasix while in house  Echo with wall motion, cardio Dr. Adams made aware, he will follow up as outpatient, cleared for d/c

## 2018-08-15 NOTE — PROGRESS NOTE ADULT - ASSESSMENT
78 yr old female with PMH hypertension, hyperlipidemia, myocardial infarction admitted with complaints of fever. She was recently discharged from ED for UTI, returned with worsening symptoms, fever and chills. Noted to have gram negative bacteremia, positive UA. She had a CT chest suggestive of bronchitis. CT abdomen was ordered, showed diverticulitis. ID was consulted, her blood cultures grew E coli resistant to Ciprofloxacin. She was started on Ceftriaxone. Repeat blood cultures negative, mid line requested for IV antibiotics at home until 8/21 as per ID. Pt was also consulted by cardio after pt developed worsening SOB secondary to fluid overload due to fluid procedure for sepsis protocol. After IV diuresis, SOB and edema resolved. Echo was ordered to evaluate LV and valvular function.. echo with decreased LVF with preserved EF. Dr. Adams made aware, will have pt follow up o/p for further work up.     Dispo plan: pt received midline yesterday. FINDING [facility] [employer] to evaluate pt/teach pt and her daughter how to administer IV meds today and then discharge to home.      Pt w mild leukocytosis today. Pt feeling well, no new s & s, likely reactive.

## 2018-08-15 NOTE — PROGRESS NOTE ADULT - PROBLEM SELECTOR PLAN 4
Continue statin.
Albuterol prn, do not feel she needs steroids.
Albuterol prn, do not feel she needs steroids.
Continue statin.

## 2018-08-15 NOTE — PROGRESS NOTE ADULT - ATTENDING COMMENTS
OK to discharge patient after PICC or mid-line placed and antibiotics arranged.   patient to follow in my office in 2 weeks.
If diarrhea improves and electrolytes improved, anticipate discharge in 24-48 hrs.
I have personally seen and examined patient.  Above note reviewed and discussed with PA, modified where appropriate.
I have personally seen and examined patient.  Above note reviewed and discussed with PA, modified where appropriate. Pt unable to give herself IV abx, teaching to be done in am, CM aware, D/c in am, midline in place

## 2018-08-15 NOTE — PROGRESS NOTE ADULT - PROBLEM SELECTOR PLAN 3
Continue medications.
Continue statin.
Continue statin.
Continue medications.

## 2018-08-15 NOTE — PROGRESS NOTE ADULT - PROBLEM SELECTOR PLAN 7
Resolved, was due to diarrhea.
Replace phosphorus
Replace potassium

## 2018-08-15 NOTE — PROGRESS NOTE ADULT - PROBLEM SELECTOR PROBLEM 2
Acute diverticulitis
HTN (hypertension)
HTN (hypertension)
SOB (shortness of breath)

## 2018-08-15 NOTE — PROGRESS NOTE ADULT - PROVIDER SPECIALTY LIST ADULT
Gastroenterology
Hospitalist
Cardiology
Infectious Disease
Hospitalist

## 2018-08-15 NOTE — PROGRESS NOTE ADULT - SUBJECTIVE AND OBJECTIVE BOX
Interval/Overnight: No acute overnight events as per RN, Chart reviewed. Pt seen/examined by attending and PA. Pt is OOB to chair, eating her lunch. Pt reports she feels well, diarrhea has basically resolved.  Awaiting infusion company to arrive for teaching pt and daughter how to administer medication. Offers no complaints     REVIEW OF SYSTEMS:    Patient denied fever, chills, abdominal pain, nausea, vomiting, cough, shortness of breath, chest pain or palpitations    Vital Signs Last 24 Hrs  T(C): 37 (15 Aug 2018 08:13), Max: 37.1 (14 Aug 2018 23:40)  T(F): 98.6 (15 Aug 2018 08:13), Max: 98.8 (14 Aug 2018 23:40)  HR: 88 (15 Aug 2018 08:13) (64 - 98)  BP: 124/60 (15 Aug 2018 08:13) (124/60 - 148/84)  BP(mean): --  RR: 18 (15 Aug 2018 08:13) (18 - 18)  SpO2: 94% (15 Aug 2018 08:13) (91% - 94%)I&O's Summary    15 Aug 2018 07:01  -  15 Aug 2018 12:30  --------------------------------------------------------  IN: 0 mL / OUT: 500 mL / NET: -500 mL    CAPILLARY BLOOD GLUCOSE    PHYSICAL EXAM:  GENERAL: NAD, well-groomed  HEENT: PERRL, +EOMI, anicteric  NECK: Supple, No JVD   CHEST/LUNG: CTA bilaterally; Normal effort  HEART: S1S2 Normal intensity, no murmurs, gallops or rubs noted  ABDOMEN: Soft, BS Normoactive, NT, ND, no HSM noted  EXTREMITIES:  2+ radial and DP pulses noted, no clubbing, cyanosis, or edema noted, FROM x 4  SKIN: No rashes or lesions noted  NEURO: A&Ox3, no focal deficits noted, CN II-XII intact  PSYCH: normal mood and affect; insight/judgement appropriate  LABS:                        14.5   12.8  )-----------( 497      ( 15 Aug 2018 08:38 )             45.6     08-15    143  |  101  |  20.0  ----------------------------<  158<H>  3.7   |  25.0  |  0.87    Ca    9.6      15 Aug 2018 08:38  Phos  3.1     08-15  Mg     1.8     08-15          RADIOLOGY & ADDITIONAL TESTS:    MEDICATIONS:  MEDICATIONS  (STANDING):  ATENolol  Tablet 100 milliGRAM(s) Oral daily  atorvastatin 40 milliGRAM(s) Oral at bedtime  cefTRIAXone   IVPB      cefTRIAXone   IVPB 1 Gram(s) IV Intermittent every 24 hours  enoxaparin Injectable 40 milliGRAM(s) SubCutaneous every 24 hours  fenofibrate Tablet 48 milliGRAM(s) Oral daily  furosemide    Tablet 40 milliGRAM(s) Oral daily  pantoprazole    Tablet 40 milliGRAM(s) Oral before breakfast  saccharomyces boulardii 250 milliGRAM(s) Oral two times a day    MEDICATIONS  (PRN):  acetaminophen   Tablet 650 milliGRAM(s) Oral every 6 hours PRN For Temp greater than 38 C (100.4 F)  ALBUTerol/ipratropium for Nebulization 3 milliLiter(s) Nebulizer every 6 hours PRN Shortness of Breath and/or Wheezing  ondansetron Injectable 4 milliGRAM(s) IV Push every 6 hours PRN Nausea and/or Vomiting

## 2018-08-15 NOTE — PROGRESS NOTE ADULT - PROBLEM SELECTOR PLAN 6
Source of bacteremia, no evidence of abscess. Advance diet currently tolerating regular diet, GI consulted, needs outpatient colonoscopy in 6-8 weeks.
Replace potassium
Source of bacteremia, no evidence of abscess. Advance diet currently tolerating regular diet, GI consulted, needs outpatient colonoscopy in 6-8 weeks.
Source of bacteremia, no evidence of abscess. Advance diet to full liquids, GI consulted, needs outpatient colonoscopy in 6-8 weeks.

## 2018-08-15 NOTE — PROGRESS NOTE ADULT - PROBLEM SELECTOR PLAN 1
repeat blood cultures negative, ID consulted, antibiotics changed, midline placed today, needs Ceftriaxone till 8/21/18 and follow up with Dr. Mccartney in 2 weeks post discharge.

## 2018-08-15 NOTE — PROGRESS NOTE ADULT - PROBLEM SELECTOR PROBLEM 1
Bacteremia
Acute diverticulitis
Bacteremia
E coli bacteremia
Bacteremia

## 2018-08-15 NOTE — PROGRESS NOTE ADULT - PROBLEM SELECTOR PLAN 5
Resolved. Albuterol prn.
Source of bacteremia, no evidence of abscess. Advance diet to full liquids, GI consulted, needs outpatient colonoscopy in 6-8 weeks.
Source of bacteremia, no evidence of abscess. Clears for now.
Albuterol prn, do not feel she needs steroids.

## 2018-08-15 NOTE — PROGRESS NOTE ADULT - PROBLEM SELECTOR PROBLEM 3
HTN (hypertension)
Acute febrile illness
High cholesterol
High cholesterol
HTN (hypertension)

## 2018-09-04 ENCOUNTER — LABORATORY RESULT (OUTPATIENT)
Age: 78
End: 2018-09-04

## 2018-09-04 ENCOUNTER — APPOINTMENT (OUTPATIENT)
Dept: INTERNAL MEDICINE | Facility: CLINIC | Age: 78
End: 2018-09-04
Payer: MEDICARE

## 2018-09-04 VITALS — TEMPERATURE: 98.3 F

## 2018-09-04 VITALS
WEIGHT: 142 LBS | BODY MASS INDEX: 24.24 KG/M2 | DIASTOLIC BLOOD PRESSURE: 61 MMHG | SYSTOLIC BLOOD PRESSURE: 136 MMHG | HEIGHT: 64 IN

## 2018-09-04 DIAGNOSIS — Z87.19 PERSONAL HISTORY OF OTHER DISEASES OF THE DIGESTIVE SYSTEM: ICD-10-CM

## 2018-09-04 DIAGNOSIS — N28.9 DISORDER OF KIDNEY AND URETER, UNSPECIFIED: ICD-10-CM

## 2018-09-04 DIAGNOSIS — R78.81 BACTEREMIA: ICD-10-CM

## 2018-09-04 PROCEDURE — 36415 COLL VENOUS BLD VENIPUNCTURE: CPT

## 2018-09-04 PROCEDURE — 99213 OFFICE O/P EST LOW 20 MIN: CPT | Mod: 25

## 2018-09-05 LAB
ALBUMIN SERPL ELPH-MCNC: 4.3 G/DL
ALP BLD-CCNC: 58 U/L
ALT SERPL-CCNC: 15 U/L
ANION GAP SERPL CALC-SCNC: 21 MMOL/L
AST SERPL-CCNC: 23 U/L
BASOPHILS # BLD AUTO: 0.23 K/UL
BASOPHILS NFR BLD AUTO: 2 %
BILIRUB SERPL-MCNC: 0.7 MG/DL
BUN SERPL-MCNC: 34 MG/DL
CALCIUM SERPL-MCNC: 10 MG/DL
CHLORIDE SERPL-SCNC: 104 MMOL/L
CO2 SERPL-SCNC: 21 MMOL/L
CREAT SERPL-MCNC: 1.62 MG/DL
EOSINOPHIL # BLD AUTO: 0.46 K/UL
EOSINOPHIL NFR BLD AUTO: 4 %
GLUCOSE SERPL-MCNC: 141 MG/DL
HCT VFR BLD CALC: 47.5 %
HGB BLD-MCNC: 14.8 G/DL
LYMPHOCYTES # BLD AUTO: 2.2 K/UL
LYMPHOCYTES NFR BLD AUTO: 19 %
MAN DIFF?: NORMAL
MCHC RBC-ENTMCNC: 28.4 PG
MCHC RBC-ENTMCNC: 31.2 GM/DL
MCV RBC AUTO: 91.2 FL
MONOCYTES # BLD AUTO: 1.27 K/UL
MONOCYTES NFR BLD AUTO: 11 %
NEUTROPHILS # BLD AUTO: 7.4 K/UL
NEUTROPHILS NFR BLD AUTO: 64 %
PLATELET # BLD AUTO: 525 K/UL
POTASSIUM SERPL-SCNC: 4.4 MMOL/L
PROT SERPL-MCNC: 7.5 G/DL
RBC # BLD: 5.21 M/UL
RBC # FLD: 15.4 %
SODIUM SERPL-SCNC: 146 MMOL/L
WBC # FLD AUTO: 11.57 K/UL

## 2018-10-25 PROCEDURE — 84145 PROCALCITONIN (PCT): CPT

## 2018-10-25 PROCEDURE — 96375 TX/PRO/DX INJ NEW DRUG ADDON: CPT | Mod: XU

## 2018-10-25 PROCEDURE — 80048 BASIC METABOLIC PNL TOTAL CA: CPT

## 2018-10-25 PROCEDURE — 83735 ASSAY OF MAGNESIUM: CPT

## 2018-10-25 PROCEDURE — 74176 CT ABD & PELVIS W/O CONTRAST: CPT

## 2018-10-25 PROCEDURE — 87086 URINE CULTURE/COLONY COUNT: CPT

## 2018-10-25 PROCEDURE — 93005 ELECTROCARDIOGRAM TRACING: CPT

## 2018-10-25 PROCEDURE — 94640 AIRWAY INHALATION TREATMENT: CPT

## 2018-10-25 PROCEDURE — 99285 EMERGENCY DEPT VISIT HI MDM: CPT | Mod: 25

## 2018-10-25 PROCEDURE — 83605 ASSAY OF LACTIC ACID: CPT

## 2018-10-25 PROCEDURE — 81001 URINALYSIS AUTO W/SCOPE: CPT

## 2018-10-25 PROCEDURE — 36415 COLL VENOUS BLD VENIPUNCTURE: CPT

## 2018-10-25 PROCEDURE — 71275 CT ANGIOGRAPHY CHEST: CPT

## 2018-10-25 PROCEDURE — 96365 THER/PROPH/DIAG IV INF INIT: CPT | Mod: XU

## 2018-10-25 PROCEDURE — 85730 THROMBOPLASTIN TIME PARTIAL: CPT

## 2018-10-25 PROCEDURE — 87040 BLOOD CULTURE FOR BACTERIA: CPT

## 2018-10-25 PROCEDURE — 96361 HYDRATE IV INFUSION ADD-ON: CPT

## 2018-10-25 PROCEDURE — 85027 COMPLETE CBC AUTOMATED: CPT

## 2018-10-25 PROCEDURE — 85610 PROTHROMBIN TIME: CPT

## 2018-10-25 PROCEDURE — 80053 COMPREHEN METABOLIC PANEL: CPT

## 2018-10-25 PROCEDURE — 83880 ASSAY OF NATRIURETIC PEPTIDE: CPT

## 2018-10-25 PROCEDURE — 93306 TTE W/DOPPLER COMPLETE: CPT

## 2018-10-25 PROCEDURE — 84100 ASSAY OF PHOSPHORUS: CPT

## 2018-10-25 PROCEDURE — 71045 X-RAY EXAM CHEST 1 VIEW: CPT

## 2019-02-12 NOTE — ED ADULT NURSE NOTE - EENT WDL
Eyes with no visual disturbances.  Ears clean and dry and no hearing difficulties. Nose with pink mucosa and no drainage.  Mouth mucous membranes moist and pink.  No tenderness or swelling to throat or neck.
90

## 2019-11-12 NOTE — H&P ADULT - OPHTHALMOLOGIC
EMERGENCY DEPARTMENT HISTORY AND PHYSICAL EXAM      Date: 11/12/2019  Patient Name: Janice Kenny    History of Presenting Illness     Chief Complaint   Patient presents with    Cough    Vomiting       History Provided By: Patient and Patient's Grandmother    Chief Complaint: URI symptoms and vomiting    Additional History (Context): Janice Kenny is a 5 y.o. male with No significant past medical history who presents with a 1 to 2-day history of runny nose and congestion, and this morning had an episode of vomiting. No fevers. Previously healthy child, immunizations up-to-date. Still hungry and eating, no abdominal pain. PCP: No primary care provider on file. Current Facility-Administered Medications   Medication Dose Route Frequency Provider Last Rate Last Dose    ondansetron (ZOFRAN ODT) tablet 2 mg  2 mg Oral NOW Fred ePterson MD         Current Outpatient Medications   Medication Sig Dispense Refill    ondansetron (ZOFRAN ODT) 4 mg disintegrating tablet Take 0.5 Tabs by mouth every six to eight (6-8) hours as needed for Nausea for up to 8 doses. 4 Tab 0    acetaminophen (TYLENOL) 160 mg/5 mL liquid Take 8 mL by mouth every six (6) hours as needed for Pain. 1 Bottle 0       Past History     Past Medical History:  Past Medical History:   Diagnosis Date    Asthma        Past Surgical History:  History reviewed. No pertinent surgical history. Family History:  History reviewed. No pertinent family history. Social History:  Social History     Tobacco Use    Smoking status: Not on file   Substance Use Topics    Alcohol use: Not on file    Drug use: Not on file       Allergies:  No Known Allergies      Review of Systems   Review of Systems   Constitutional: Negative for activity change, chills, crying and fever. ROS per family at bedside     HENT: Positive for congestion and rhinorrhea. Negative for ear pain, sneezing and trouble swallowing. Eyes: Negative for pain and redness. Respiratory: Negative for cough and wheezing. Cardiovascular: Negative for chest pain. Gastrointestinal: Positive for vomiting. Negative for abdominal pain, diarrhea and nausea. Genitourinary: Negative for difficulty urinating, dysuria and frequency. Musculoskeletal: Negative for back pain and neck stiffness. Skin: Negative for pallor. Neurological: Negative for syncope and headaches. Hematological: Does not bruise/bleed easily. Psychiatric/Behavioral: Negative for confusion. All other systems reviewed and are negative. Physical Exam     Vitals:    11/12/19 1122   Pulse: 15   Resp: 20   Temp: 99.4 °F (37.4 °C)   SpO2: 99%   Weight: 17 kg     Physical Exam   Constitutional: He appears well-developed. HENT:   Left Ear: Tympanic membrane normal.   Nose: Nasal discharge present. Mouth/Throat: No tonsillar exudate. Oropharynx is clear. Pharynx is normal.   Has a serous otitis media on the right. Has clear mild bilateral rhinorrhea. Eyes: Pupils are equal, round, and reactive to light. Conjunctivae and EOM are normal.   Neck: Normal range of motion. Neck supple. No neck adenopathy. Cardiovascular: Regular rhythm, S1 normal and S2 normal.   Pulmonary/Chest: Effort normal and breath sounds normal. No respiratory distress. Abdominal: Soft. Bowel sounds are normal. He exhibits no distension and no mass. There is no hepatosplenomegaly. There is no tenderness. There is no rebound and no guarding. Neurological: He is alert. Skin: No rash noted. Nursing note and vitals reviewed. Diagnostic Study Results     Labs -   No results found for this or any previous visit (from the past 12 hour(s)). Radiologic Studies -   No orders to display     CT Results  (Last 48 hours)    None        CXR Results  (Last 48 hours)    None            Medical Decision Making   I am the first provider for this patient.     I reviewed the vital signs, available nursing notes, past medical history, past surgical history, family history and social history. Vital Signs-Reviewed the patient's vital signs. ED Course:   Drink juice in the emergency department with no vomiting. Disposition:  Discharge home    DISCHARGE NOTE:     Pt has been reexamined. Patient has no new complaints, changes, or physical findings. Care plan outlined and precautions discussed. All medications were reviewed with the patient and grandmother; will d/c home with Tylenol and Zofran. All of pt's questions and concerns were addressed. Patient was instructed and agrees to follow up with his pediatrician, as well as to return to the ED upon further deterioration. Patient is ready to go home. Follow-up Information     Follow up With Specialties Details Why Contact Info    your pediatrician  Schedule an appointment as soon as possible for a visit in 2 days As needed, If symptoms worsen     [facility] EMERGENCY DEPT Emergency Medicine  As needed, If symptoms worsen [address] [facility] [address].  [phone]          Current Discharge Medication List      START taking these medications    Details   ondansetron (ZOFRAN ODT) 4 mg disintegrating tablet Take 0.5 Tabs by mouth every six to eight (6-8) hours as needed for Nausea for up to 8 doses. Qty: 4 Tab, Refills: 0      acetaminophen (TYLENOL) 160 mg/5 mL liquid Take 8 mL by mouth every six (6) hours as needed for Pain. Qty: 1 Bottle, Refills: 0             Provider Notes (Medical Decision Making):   Previously healthy immunized child who with 2 days of URI symptoms and this morning with vomiting. Benign abdominal exam, clinical well-hydrated, nontoxic. Low suspicion for significant bacterial infection. Suspect viral etiology. Symptomatic care with Tylenol and Zofran, close outpatient PCP follow-up. Also has serous right otitis media; no indication for antibiotics; outpatient PCP follow-up to document complete resolution of fluid. Diagnosis     Clinical Impression:   1. Non-intractable vomiting with nausea, unspecified vomiting type    2. Viral syndrome    3.  Non-recurrent acute serous otitis media of right ear details…
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
## 2021-03-25 NOTE — DISCHARGE NOTE ADULT - NSFTFCONTACT3DT_GEN_ALL_CORE
Duration Of Freeze Thaw-Cycle (Seconds): 10 Include Z78.9 (Other Specified Conditions Influencing Health Status) As An Associated Diagnosis?: Yes Detail Level: Zone Medical Necessity Clause: This procedure was medically necessary because the lesions that were treated were: irritated Render Note In Bullet Format When Appropriate: No Consent: The patient's consent was obtained including but not limited to risks of crusting, scabbing, blistering, scarring, darker or lighter pigmentary change, recurrence, incomplete removal and infection. Post-Care Instructions: I reviewed with the patient in detail post-care instructions. Patient is to wear sunprotection, and avoid picking at any of the treated lesions. Pt may apply Vaseline to crusted or scabbing areas. Medical Necessity Information: It is in your best interest to select a reason for this procedure from the list below. All of these items fulfill various CMS LCD requirements except the new and changing color options. Number Of Freeze-Thaw Cycles: 2 freeze-thaw cycles Duration Of Freeze Thaw-Cycle (Seconds): 3 13-Aug-2018

## 2021-06-03 NOTE — ED PROVIDER NOTE - PROGRESS NOTE
Refill Approved    Rx renewed per Medication Renewal Policy. Medication was last renewed on 5/24/2019.    Luis Enrique Meneses, [facility] [place] Connection Triage/Med Refill 11/28/2019     Requested Prescriptions   Pending Prescriptions Disp Refills     TRULICITY 1.5 mg/0.5 mL PnIj [Pharmacy Med Name: Trulicity Subcutaneous Solution Pen-injector 1.5 MG/0.5ML] 2 mL 4     Sig: INJECT 1.5 MG UNDER THE SKIN ONCE A WEEK.       Insulin/GLP-1 Refill Protocol Passed - 11/26/2019  7:01 AM        Passed - Visit with PCP or prescribing provider visit in last 6 months     Last office visit with prescriber/PCP: 11/22/2019 OR same dept: 11/22/2019 Carol Chavez MD OR same specialty: 11/22/2019 Carol Chavez MD Last physical: Visit date not found Last MTM visit: Visit date not found     Next appt within 3 mo: Visit date not found  Next physical within 3 mo: Visit date not found  Prescriber OR PCP: Carol Chavez MD  Last diagnosis associated with med order: 1. Type 2 diabetes mellitus without complication, without long-term current use of insulin (H)  - TRULICITY 1.5 mg/0.5 mL PnIj [Pharmacy Med Name: Trulicity Subcutaneous Solution Pen-injector 1.5 MG/0.5ML]; INJECT 1.5 MG UNDER THE SKIN ONCE A WEEK.  Dispense: 2 mL; Refill: 4    If protocol passes may refill for 6 months if within 3 months of last provider visit (or a total of 9 months).              Passed - A1C in last 6 months     Hemoglobin A1c   Date Value Ref Range Status   11/22/2019 9.2 (H) 3.5 - 6.0 % Final               Passed - Microalbumin in last year     Microalbumin, Random Urine   Date Value Ref Range Status   12/05/2018 1.21 0.00 - 1.99 mg/dL Final                  Passed - Blood pressure in last year     BP Readings from Last 1 Encounters:   11/22/19 124/68             Passed - Creatinine done in last year     Creatinine   Date Value Ref Range Status   04/03/2019 0.84 0.60 - 1.10 mg/dL Final                         
Improved.

## 2023-03-09 NOTE — BRIEF OPERATIVE NOTE - ASSISTANT(S)
[facility] [employer] Manual Repair Warning Statement: We plan on removing the manually selected variable below in favor of our much easier automatic structured text blocks found in the previous tab. We decided to do this to help make the flow better and give you the full power of structured data. Manual selection is never going to be ideal in our platform and I would encourage you to avoid using manual selection from this point on, especially since I will be sunsetting this feature. It is important that you do one of two things with the customized text below. First, you can save all of the text in a word file so you can have it for future reference. Second, transfer the text to the appropriate area in the Library tab. Lastly, if there is a flap or graft type which we do not have you need to let us know right away so I can add it in before the variable is hidden. No need to panic, we plan to give you roughly 6 months to make the change.

## 2024-05-27 NOTE — ED ADULT TRIAGE NOTE - BSA (M2)
No care due was identified.  [facility] Embedded Care Due Messages. Reference number: [id number].   5/27/2024 1:56:33 PM CDT   1.73

## 2024-05-28 NOTE — ED PROVIDER NOTE - PSYCHIATRIC NEGATIVE STATEMENT, MLM
Message Type:  Medication Question or Concern    Medication Question:  patient wants to know if she can take the equate (Acetaminophen 325 mg longer than 10 days she's still having arm/wrist pain after being seen in [facility] 05/22/24 dilan fields  Preferred pharmacy verified and selected.     Thank you for calling [facility], your medication(s) have been sent to your provider for further review. Please contact your provider if you have not received an update within 48 hours regarding your medication refill. Please do not respond to this message.       no known mental health issues.